# Patient Record
Sex: FEMALE | Race: WHITE | URBAN - METROPOLITAN AREA
[De-identification: names, ages, dates, MRNs, and addresses within clinical notes are randomized per-mention and may not be internally consistent; named-entity substitution may affect disease eponyms.]

---

## 2024-01-17 ENCOUNTER — TELEPHONE (OUTPATIENT)
Dept: OBGYN CLINIC | Age: 29
End: 2024-01-17

## 2024-01-17 NOTE — TELEPHONE ENCOUNTER
Patient has preg conf scheduled for 1-25 with EPH. Had US with PCP on 1-8 and is concerned that [employer] [place] not [place] US. Advised to schedule US due to being 3 weeks apart and would confirm with clinical if she needed another US or can just have appt with phys. Pt understood. Please advise if US from 1-8 can be used or if she will still need US on 1-25? [place] [employer] [place]. LMP 11-24.

## 2024-01-29 ENCOUNTER — OFFICE VISIT (OUTPATIENT)
Dept: OBGYN CLINIC | Age: 29
End: 2024-01-29
Payer: COMMERCIAL

## 2024-01-29 ENCOUNTER — PROCEDURE VISIT (OUTPATIENT)
Dept: OBGYN CLINIC | Age: 29
End: 2024-01-29
Payer: COMMERCIAL

## 2024-01-29 VITALS
BODY MASS INDEX: 18.85 KG/M2 | WEIGHT: 96 LBS | HEIGHT: 60 IN | SYSTOLIC BLOOD PRESSURE: 98 MMHG | DIASTOLIC BLOOD PRESSURE: 58 MMHG

## 2024-01-29 DIAGNOSIS — N92.6 MISSED MENSES: Primary | ICD-10-CM

## 2024-01-29 DIAGNOSIS — Z3A.09 9 WEEKS GESTATION OF PREGNANCY: Primary | ICD-10-CM

## 2024-01-29 DIAGNOSIS — Z3A.09 9 WEEKS GESTATION OF PREGNANCY: ICD-10-CM

## 2024-01-29 LAB
ABO + RH BLD: NORMAL
BASOPHILS # BLD: 0 K/UL (ref 0–0.2)
BASOPHILS NFR BLD: 0 % (ref 0–2)
BLOOD GROUP ANTIBODIES SERPL: NORMAL
DIFFERENTIAL METHOD BLD: NORMAL
EOSINOPHIL # BLD: 0.1 K/UL (ref 0–0.8)
EOSINOPHIL NFR BLD: 1 % (ref 0.5–7.8)
ERYTHROCYTE [DISTWIDTH] IN BLOOD BY AUTOMATED COUNT: 13.2 % (ref 11.9–14.6)
HBV SURFACE AG SER QL: NONREACTIVE
HCT VFR BLD AUTO: 36.4 % (ref 35.8–46.3)
HCV AB SER QL: NONREACTIVE
HGB BLD-MCNC: 12.1 G/DL (ref 11.7–15.4)
HIV 1+2 AB+HIV1 P24 AG SERPL QL IA: NONREACTIVE
HIV 1/2 RESULT COMMENT: NORMAL
IMM GRANULOCYTES # BLD AUTO: 0.1 K/UL (ref 0–0.5)
IMM GRANULOCYTES NFR BLD AUTO: 1 % (ref 0–5)
LYMPHOCYTES # BLD: 1.3 K/UL (ref 0.5–4.6)
LYMPHOCYTES NFR BLD: 15 % (ref 13–44)
MCH RBC QN AUTO: 28.2 PG (ref 26.1–32.9)
MCHC RBC AUTO-ENTMCNC: 33.2 G/DL (ref 31.4–35)
MCV RBC AUTO: 84.8 FL (ref 82–102)
MONOCYTES # BLD: 0.5 K/UL (ref 0.1–1.3)
MONOCYTES NFR BLD: 5 % (ref 4–12)
NEUTS SEG # BLD: 7.3 K/UL (ref 1.7–8.2)
NEUTS SEG NFR BLD: 78 % (ref 43–78)
NRBC # BLD: 0 K/UL (ref 0–0.2)
PLATELET # BLD AUTO: 249 K/UL (ref 150–450)
PMV BLD AUTO: 10.6 FL (ref 9.4–12.3)
RBC # BLD AUTO: 4.29 M/UL (ref 4.05–5.2)
RUBV IGG SERPL IA-ACNC: 68.1 IU/ML
WBC # BLD AUTO: 9.2 K/UL (ref 4.3–11.1)

## 2024-01-29 PROCEDURE — 76830 TRANSVAGINAL US NON-OB: CPT | Performed by: OBSTETRICS & GYNECOLOGY

## 2024-01-29 PROCEDURE — 99203 OFFICE O/P NEW LOW 30 MIN: CPT | Performed by: OBSTETRICS & GYNECOLOGY

## 2024-01-29 RX ORDER — FERROUS SULFATE 325(65) MG
325 TABLET ORAL
COMMUNITY

## 2024-01-29 RX ORDER — VITAMIN B COMPLEX
1 CAPSULE ORAL DAILY
COMMUNITY

## 2024-01-29 RX ORDER — ASCORBIC ACID 500 MG
500 TABLET ORAL DAILY
COMMUNITY

## 2024-01-29 RX ORDER — SOY ISOFLAVONE 40 MG
500 TABLET ORAL 3 TIMES DAILY
COMMUNITY

## 2024-01-29 RX ORDER — OMEGA-3 FATTY ACIDS/FISH OIL 300-1000MG
CAPSULE ORAL
COMMUNITY

## 2024-01-29 RX ORDER — M-VIT,TX,IRON,MINS/CALC/FOLIC 27MG-0.4MG
1 TABLET ORAL DAILY
COMMUNITY

## 2024-01-29 SDOH — ECONOMIC STABILITY: HOUSING INSECURITY
IN THE LAST 12 MONTHS, WAS THERE A TIME WHEN YOU DID NOT HAVE A STEADY PLACE TO SLEEP OR SLEPT IN A SHELTER (INCLUDING NOW)?: NO

## 2024-01-29 SDOH — ECONOMIC STABILITY: INCOME INSECURITY: HOW HARD IS IT FOR YOU TO PAY FOR THE VERY BASICS LIKE FOOD, HOUSING, MEDICAL CARE, AND HEATING?: NOT HARD AT ALL

## 2024-01-29 SDOH — ECONOMIC STABILITY: FOOD INSECURITY: WITHIN THE PAST 12 MONTHS, THE FOOD YOU BOUGHT JUST DIDN'T LAST AND YOU DIDN'T HAVE MONEY TO GET MORE.: NEVER TRUE

## 2024-01-29 SDOH — ECONOMIC STABILITY: FOOD INSECURITY: WITHIN THE PAST 12 MONTHS, YOU WORRIED THAT YOUR FOOD WOULD RUN OUT BEFORE YOU GOT MONEY TO BUY MORE.: NEVER TRUE

## 2024-01-29 ASSESSMENT — PATIENT HEALTH QUESTIONNAIRE - PHQ9
2. FEELING DOWN, DEPRESSED OR HOPELESS: 0
SUM OF ALL RESPONSES TO PHQ QUESTIONS 1-9: 0
SUM OF ALL RESPONSES TO PHQ QUESTIONS 1-9: 0
SUM OF ALL RESPONSES TO PHQ9 QUESTIONS 1 & 2: 0
1. LITTLE INTEREST OR PLEASURE IN DOING THINGS: 0
SUM OF ALL RESPONSES TO PHQ QUESTIONS 1-9: 0
SUM OF ALL RESPONSES TO PHQ QUESTIONS 1-9: 0

## 2024-01-29 NOTE — PROGRESS NOTES
Shalonda Uribe ,  presents for pregnancy confirmation and establish WILLARD.  Patient's last menstrual period was 2023 (exact date)., she reports regular cycles.  This pregnancy was planned.      Her Ob history is as follows:  # 1 - Date: None, Sex: None, Weight: None, GA: None, Delivery: None, Apgar1: None, Apgar5: None, Living: None, Birth Comments: None      Past medical History:   Active Ambulatory Problems     Diagnosis Date Noted    No Active Ambulatory Problems     Resolved Ambulatory Problems     Diagnosis Date Noted    No Resolved Ambulatory Problems     No Additional Past Medical History       Current Outpatient Medications on File Prior to Visit   Medication Sig Dispense Refill    b complex vitamins capsule Take 1 capsule by mouth daily      vitamin D (CHOLECALCIFEROL) 125 MCG (5000 UT) CAPS capsule Take 1 capsule by mouth daily      vitamin C (ASCORBIC ACID) 500 MG tablet Take 1 tablet by mouth daily      l-arginine 500 MG capsule Take 1 capsule by mouth 3 times daily      Omega 3 1000 MG CAPS Take by mouth      Multiple Vitamins-Minerals (THERAPEUTIC MULTIVITAMIN-MINERALS) tablet Take 1 tablet by mouth daily      ferrous sulfate (IRON 325) 325 (65 Fe) MG tablet Take 1 tablet by mouth daily (with breakfast)      Levomefolate Glucosamine (METHYL-FOLATE PO) Take by mouth      CREATINE PO Take by mouth       No current facility-administered medications on file prior to visit.        Past Surgical:  has no past surgical history on file.    Shalonda  reports that she has never smoked. She has never used smokeless tobacco.    Review of Systems    Blood pressure (!) 98/58, height 1.524 m (5'), weight 43.5 kg (96 lb), last menstrual period 2023. Body mass index is 18.75 kg/m².     Physical Exam      [facility] - OB ULTRASOUND REPORT     Patient Information  2024  Shalonda Uribe 1995     28 y.o.       LNMP: Patient's last menstrual period was 2023 (exact date).  GA by LMP:  9 Weeks   3

## 2024-01-29 NOTE — PATIENT INSTRUCTIONS
Learning About Pregnancy  Your Care Instructions     Your health in the early weeks of your pregnancy is particularly important for your baby's health. Take good care of yourself. Anything you do that harms your body can also harm your baby.  Make sure to go to all of your doctor appointments. Regular checkups will help keep you and your baby healthy.  How can you care for yourself at home?  Diet    Eat a balanced diet. Make sure your diet includes plenty of beans, peas, and leafy green vegetables.     Do not skip meals or go for many hours without eating. If you are nauseated, try to eat a small, healthy snack every 2 to 3 hours.     Do not eat fish that has a high level of mercury, such as shark, swordfish, or mackerel. Do not eat more than one can of tuna each week.     Drink plenty of fluids. If you have kidney, heart, or liver disease and have to limit fluids, talk with your doctor before you increase the amount of fluids you drink.     Cut down on caffeine, such as coffee, tea, and cola.     Do not drink alcohol, such as beer, wine, or hard liquor.     Take a multivitamin that contains at least 400 micrograms (mcg) of folic acid to help prevent birth defects. Fortified cereal and whole wheat bread are good additional sources of folic acid.     Increase the calcium in your diet. Try to drink a quart of skim milk each day. You may also take calcium supplements and choose foods such as cheese and yogurt.   Lifestyle    Make sure you go to your follow-up appointments.     Get plenty of rest. You may be unusually tired while you are pregnant.     Get at least 30 minutes of exercise on most days of the week. Walking is a good choice. If you have not exercised in the past, start out slowly. Take several short walks each day.     Do not smoke. If you need help quitting, talk to your doctor about stop-smoking programs. These can increase your chances of quitting for good.     Do not touch cat feces or litter

## 2024-01-30 LAB — RPR SER QL: NONREACTIVE

## 2024-01-31 LAB
BACTERIA SPEC CULT: NORMAL
SERVICE CMNT-IMP: NORMAL

## 2024-02-12 NOTE — PROGRESS NOTES
NOB consult with pt. Labs today: NIPT, Carrier screen. Offered pt the option of CF, SMA, and Fragile X carrier testing. Pt desires testing. Offered pt the option of genetic screening (1st screen vs Tetra vs NIPT). Pt desires NIPT . Instructed pt on exercise/nutrition in pregnancy. Reviewed [facility] preg book. Advised pt on using [facility] for hospital needs and [facility] L&D for pregnancy related emergencies. Pt states understanding. NOB forms signed, scanned, and given to pt.    Medical Hx: Endometriosis (2023) - High cholesterol (2010)    IMPACTT Program discussed with patient and sheet given to her during visit today. Encouraged her to inform us should she start having increase in depression/anxiety.    Surgical Hx: Eye surgery (2016) lasix.    Last Pap: 2021    Pt OB c/o: None     Fam hx any chromosomal or inheritable disorders: Father of baby's paternal cousin has cystic fibrosis    COVID Vaccine: x2 per patient  Flu Vaccine: Discussed flu recommendations with patient. Patient declines flu vaccine.    OB hx: G1: 2/2024 Current    NV: Next appointment scheduled on 2/29 with Dr Hyatt

## 2024-02-14 ENCOUNTER — NURSE ONLY (OUTPATIENT)
Dept: OBGYN CLINIC | Age: 29
End: 2024-02-14

## 2024-02-14 VITALS — BODY MASS INDEX: 18.83 KG/M2 | WEIGHT: 96.4 LBS

## 2024-02-14 DIAGNOSIS — Z34.91 NORMAL PREGNANCY, FIRST TRIMESTER: Primary | ICD-10-CM

## 2024-02-14 DIAGNOSIS — Z3A.11 11 WEEKS GESTATION OF PREGNANCY: ICD-10-CM

## 2024-02-14 NOTE — PATIENT INSTRUCTIONS
Patient Education        Exercise During Pregnancy: Care Instructions  Overview     Exercise is good for you during a healthy pregnancy. It can relieve back pain, swelling, and other discomforts. It also prepares your muscles for childbirth. And exercise can improve your energy level and help you sleep better.  If your doctor advises it, get more exercise. For example, walking is a good choice. Bit by bit, increase the amount you walk every day. Try for at least 30 minutes on most days of the week. You could also try a prenatal exercise class. But if you do not already exercise, be sure to talk with your doctor before you start a new exercise program. Doctors do not recommend contact sports during pregnancy.  Follow-up care is a key part of your treatment and safety. Be sure to make and go to all appointments, and call your doctor if you are having problems. It's also a good idea to know your test results and keep a list of the medicines you take.  How can you care for yourself at home?  Talk with your doctor about the right kind of exercise for each stage of pregnancy.  Listen to your body to know if your exercise is at a safe level.  Do not become overheated while you exercise. High body temperature can be harmful. Avoid activities that can make your body too hot.  If you feel tired, take it easy. You might walk instead of run.  If you are used to strenuous exercise, ask your doctor how to know when it's time to slow down.  If you exercised before getting pregnant, you should be able to keep up your routine early in your pregnancy. Later in your pregnancy, you may want to switch to more gentle activities.  Drink plenty of fluids before, during, and after exercise.  Avoid contact sports, such as soccer and basketball. Also avoid risky activities. These include scuba diving, horseback riding, and exercising at a high altitude (above 6,000 feet). If you live in a place with a high altitude, talk to your doctor about

## 2024-02-21 LAB
Lab: NORMAL
NTRA FETAL FRACTION: NORMAL
NTRA GENDER OF FETUS: NORMAL
NTRA MONOSOMY X AGE-BASED RISK TEXT: NORMAL
NTRA MONOSOMY X RESULT TEXT: NORMAL
NTRA MONOSOMY X RISK SCORE TEXT: NORMAL
NTRA TRIPLOIDY RESULT TEXT: NORMAL
NTRA TRISOMY 13 AGE-BASED RISK TEXT: NORMAL
NTRA TRISOMY 13 RESULT TEXT: NORMAL
NTRA TRISOMY 13 RISK SCORE TEXT: NORMAL
NTRA TRISOMY 18 AGE-BASED RISK TEXT: NORMAL
NTRA TRISOMY 18 RESULT TEXT: NORMAL
NTRA TRISOMY 18 RISK SCORE TEXT: NORMAL
NTRA TRISOMY 21 AGE-BASED RISK TEXT: NORMAL
NTRA TRISOMY 21 RESULT TEXT: NORMAL
NTRA TRISOMY 21 RISK SCORE TEXT: NORMAL

## 2024-02-22 LAB
Lab: NEGATIVE
Lab: NORMAL
NTRA CYSTIC FIBROSIS: NEGATIVE
NTRA FRAGILE X SYNDROME: NEGATIVE
NTRA SPINAL MUSCULAR ATROPHY: NEGATIVE

## 2024-02-26 PROBLEM — Z34.00 NORMAL PREGNANCY, FIRST: Status: ACTIVE | Noted: 2024-02-26

## 2024-03-01 ENCOUNTER — ROUTINE PRENATAL (OUTPATIENT)
Dept: OBGYN CLINIC | Age: 29
End: 2024-03-01
Payer: COMMERCIAL

## 2024-03-01 VITALS — BODY MASS INDEX: 19.14 KG/M2 | DIASTOLIC BLOOD PRESSURE: 58 MMHG | SYSTOLIC BLOOD PRESSURE: 98 MMHG | WEIGHT: 98 LBS

## 2024-03-01 DIAGNOSIS — Z11.3 ROUTINE SCREENING FOR STI (SEXUALLY TRANSMITTED INFECTION): ICD-10-CM

## 2024-03-01 DIAGNOSIS — Z34.01 ENCOUNTER FOR SUPERVISION OF NORMAL FIRST PREGNANCY IN FIRST TRIMESTER: ICD-10-CM

## 2024-03-01 DIAGNOSIS — Z3A.14 14 WEEKS GESTATION OF PREGNANCY: Primary | ICD-10-CM

## 2024-03-01 DIAGNOSIS — Z12.4 SCREENING FOR CERVICAL CANCER: ICD-10-CM

## 2024-03-01 DIAGNOSIS — Z11.8 ENCOUNTER FOR SCREENING FOR OTHER INFECTIOUS AND PARASITIC DISEASES: ICD-10-CM

## 2024-03-01 PROCEDURE — 0501F PRENATAL FLOW SHEET: CPT | Performed by: OBSTETRICS & GYNECOLOGY

## 2024-03-01 PROCEDURE — 99459 PELVIC EXAMINATION: CPT | Performed by: OBSTETRICS & GYNECOLOGY

## 2024-03-01 NOTE — PROGRESS NOTES
Shalonda  presents for new OB. . 14w0d.    PL and any MFM notes reviewed.. Med list reviewed.  Taking Prenatal Vitamins: Yes  She is noticing:  mild cramping, no other complaints    Physical / pap completed.  Clinical chaperone present.    The following were addressed today:  Problem List          High    Normal pregnancy, first      Shalonda was seen today for initial prenatal visit.    Diagnoses and all orders for this visit:    14 weeks gestation of pregnancy  -     PAP IG, CT-NG-TV, rfx Aptima HPV ASCUS ([id number]); Future  -     PAP IG, CT-NG-TV, rfx Aptima HPV ASCUS ([id number])    Routine screening for STI (sexually transmitted infection)  -     PAP IG, CT-NG-TV, rfx Aptima HPV ASCUS ([id number]); Future  -     PAP IG, CT-NG-TV, rfx Aptima HPV ASCUS ([id number])    Encounter for screening for other infectious and parasitic diseases  -     PAP IG, CT-NG-TV, rfx Aptima HPV ASCUS ([id number]); Future  -     PAP IG, CT-NG-TV, rfx Aptima HPV ASCUS ([id number])    Encounter for supervision of normal first pregnancy in first trimester    Screening for cervical cancer  -     PAP IG, CT-NG-TV, rfx Aptima HPV ASCUS ([id number]); Future  -     PAP IG, CT-NG-TV, rfx Aptima HPV ASCUS ([id number])         Return in about 4 weeks (around 3/29/2024) for US Rylee (Anatomy), YUNI hernandez EPH 2-3 weeks after that.    Mark Hyatt MD

## 2024-03-01 NOTE — PATIENT INSTRUCTIONS
Content Version: 13.0  © 6937-6499 [employer].   Care instructions adapted under license by [employer] (which disclaims liability or warranty for this information). If you have questions about a medical condition or this instruction, always ask your healthcare professional. [employer] disclaims any warranty or liability for your use of this information.

## 2024-03-09 LAB
C TRACH RRNA CVX QL NAA+PROBE: NEGATIVE
COLLECTION METHOD: NORMAL
CYTOLOGIST CVX/VAG CYTO: NORMAL
CYTOLOGY CVX/VAG DOC THIN PREP: NORMAL
HPV REFLEX: NORMAL
Lab: NORMAL
N GONORRHOEA RRNA CVX QL NAA+PROBE: NEGATIVE
OTHER PT INFO: NORMAL
PAP SOURCE: NORMAL
PATH REPORT.FINAL DX SPEC: NORMAL
PREV CYTO INFO: NORMAL
PREV TREATMENT RESULTS: NORMAL
PREV TREATMENT: NORMAL
STAT OF ADQ CVX/VAG CYTO-IMP: NORMAL
T VAGINALIS RRNA SPEC QL NAA+PROBE: NEGATIVE

## 2024-03-27 ENCOUNTER — ROUTINE PRENATAL (OUTPATIENT)
Dept: OBGYN CLINIC | Age: 29
End: 2024-03-27

## 2024-03-27 VITALS — SYSTOLIC BLOOD PRESSURE: 104 MMHG | BODY MASS INDEX: 19.92 KG/M2 | WEIGHT: 102 LBS | DIASTOLIC BLOOD PRESSURE: 62 MMHG

## 2024-03-27 DIAGNOSIS — Z3A.17 17 WEEKS GESTATION OF PREGNANCY: ICD-10-CM

## 2024-03-27 DIAGNOSIS — Z34.02 ENCOUNTER FOR SUPERVISION OF NORMAL FIRST PREGNANCY IN SECOND TRIMESTER: Primary | ICD-10-CM

## 2024-03-27 PROCEDURE — 0502F SUBSEQUENT PRENATAL CARE: CPT | Performed by: NURSE PRACTITIONER

## 2024-03-27 RX ORDER — GUAIFENESIN/EPHEDRINE HCL 200-12.5MG
TABLET ORAL
COMMUNITY

## 2024-03-27 NOTE — PATIENT INSTRUCTIONS
PTL/labor precautions, FMC, and pregnancy warning signs reviewed. Pt advised to call the office at [phone] or go straight to Labor and Delivery at [facility] with any of the following concerns vaginal bleeding, leaking of fluid, cesar regularly Q 5-7 minutes for over an hour.

## 2024-03-27 NOTE — PROGRESS NOTES
OB return  Shalonda Uribe is a 28 y.o. female   with 17w5d IUP with Estimated Date of Delivery: 24 presenting to the clinic as a routine OB. Here today with her .     She denies feeling regular contractions, leakage of fluid or vaginal bleeding. States not yet feeling fetal movements.     She denies nausea or vomiting and reports eating and drinking without any difficulties. States did hdz with constipation however, started recent vitamins which has assisted with constipation. Reports last bowel movement was yesterday.     Denies HA, blurred vision or RUQ pain.     FHTs: 150s      Physical Examination:  /62   Wt 46.3 kg (102 lb)   LMP 2023 (Exact Date)   BMI 19.92 kg/m²    Gen: AAOx3  Ab: soft, NTTP, gravid uterus  Skin: no edema    Plan:  --Encouraged continuing to increase water intake daily to prevent dehydration.  --Discussed water aerobics and informed her okay to do as long as not lifting >15lbs.   --RTC 4 weeks as scheduled for routine ob visit with anatomy ultrasound.     PTL/labor precautions, FMC, and pregnancy warning signs reviewed. Pt advised to call the office at [phone] or go straight to Labor and Delivery at [facility] with any of the following concerns vaginal bleeding, leaking of fluid, cesar regularly Q 5-7 minutes for over an hour.

## 2024-04-17 ENCOUNTER — ROUTINE PRENATAL (OUTPATIENT)
Dept: OBGYN CLINIC | Age: 29
End: 2024-04-17

## 2024-04-17 ENCOUNTER — PROCEDURE VISIT (OUTPATIENT)
Dept: OBGYN CLINIC | Age: 29
End: 2024-04-17
Payer: COMMERCIAL

## 2024-04-17 VITALS — DIASTOLIC BLOOD PRESSURE: 62 MMHG | WEIGHT: 105 LBS | BODY MASS INDEX: 20.51 KG/M2 | SYSTOLIC BLOOD PRESSURE: 90 MMHG

## 2024-04-17 DIAGNOSIS — Z34.02 ENCOUNTER FOR SUPERVISION OF NORMAL FIRST PREGNANCY IN SECOND TRIMESTER: Primary | ICD-10-CM

## 2024-04-17 DIAGNOSIS — Z36.89 ENCOUNTER FOR FETAL ANATOMIC SURVEY: Primary | ICD-10-CM

## 2024-04-17 PROCEDURE — 76805 OB US >/= 14 WKS SNGL FETUS: CPT | Performed by: OBSTETRICS & GYNECOLOGY

## 2024-04-17 PROCEDURE — 0502F SUBSEQUENT PRENATAL CARE: CPT | Performed by: OBSTETRICS & GYNECOLOGY

## 2024-04-17 NOTE — PROGRESS NOTES
Shalonda  presents for YUNI. . 20w5d.    PL and any MFM notes reviewed.. Med list reviewed.  Taking Prenatal Vitamins: Yes  She is noticing:  no unusual complaints    The following were addressed today:  Problem List          Other    Normal pregnancy, first - Primary

## 2024-04-17 NOTE — PROGRESS NOTES
Shalonda  presents for YUNI. . 20w5d.    PL and any MFM notes reviewed.. Med list reviewed.  Taking Prenatal Vitamins: Yes  She is noticing:  no unusual complaints    US matthew complete appropriate growth  The following were addressed today:  Problem List          High    Normal pregnancy, first - Primary

## 2024-05-14 NOTE — PROGRESS NOTES
Shalonda  presents for YUNI. . 24w4d.    PL and any MFM notes reviewed.. Med list reviewed.  Taking Prenatal Vitamins: Yes, also Fe, magnesium, Vit D  She is noticing:  no unusual complaints    The following were addressed today:  Problem List          High    Normal pregnancy, first - Primary

## 2024-05-15 ENCOUNTER — ROUTINE PRENATAL (OUTPATIENT)
Dept: OBGYN CLINIC | Age: 29
End: 2024-05-15

## 2024-05-15 VITALS — SYSTOLIC BLOOD PRESSURE: 108 MMHG | DIASTOLIC BLOOD PRESSURE: 60 MMHG | BODY MASS INDEX: 21.48 KG/M2 | WEIGHT: 110 LBS

## 2024-05-15 DIAGNOSIS — Z34.02 ENCOUNTER FOR SUPERVISION OF NORMAL FIRST PREGNANCY IN SECOND TRIMESTER: Primary | ICD-10-CM

## 2024-05-15 PROCEDURE — 0502F SUBSEQUENT PRENATAL CARE: CPT | Performed by: OBSTETRICS & GYNECOLOGY

## 2024-05-15 RX ORDER — VITAMIN B COMPLEX
TABLET ORAL
COMMUNITY

## 2024-05-15 RX ORDER — ZINC GLUCONATE 50 MG
50 TABLET ORAL DAILY
COMMUNITY

## 2024-06-07 ENCOUNTER — ROUTINE PRENATAL (OUTPATIENT)
Dept: OBGYN CLINIC | Age: 29
End: 2024-06-07

## 2024-06-07 VITALS — BODY MASS INDEX: 22.26 KG/M2 | WEIGHT: 114 LBS | DIASTOLIC BLOOD PRESSURE: 62 MMHG | SYSTOLIC BLOOD PRESSURE: 102 MMHG

## 2024-06-07 DIAGNOSIS — Z3A.28 28 WEEKS GESTATION OF PREGNANCY: ICD-10-CM

## 2024-06-07 DIAGNOSIS — O98.511 COVID-19 AFFECTING PREGNANCY IN FIRST TRIMESTER: ICD-10-CM

## 2024-06-07 DIAGNOSIS — Z34.02 ENCOUNTER FOR SUPERVISION OF NORMAL FIRST PREGNANCY IN SECOND TRIMESTER: ICD-10-CM

## 2024-06-07 DIAGNOSIS — Z13.0 SCREENING FOR IRON DEFICIENCY ANEMIA: ICD-10-CM

## 2024-06-07 DIAGNOSIS — Z13.1 SCREENING FOR DIABETES MELLITUS: Primary | ICD-10-CM

## 2024-06-07 DIAGNOSIS — U07.1 COVID-19 AFFECTING PREGNANCY IN FIRST TRIMESTER: ICD-10-CM

## 2024-06-07 LAB
BASOPHILS # BLD: 0 K/UL (ref 0–0.2)
BASOPHILS NFR BLD: 0 % (ref 0–2)
DIFFERENTIAL METHOD BLD: ABNORMAL
EOSINOPHIL # BLD: 0 K/UL (ref 0–0.8)
EOSINOPHIL NFR BLD: 1 % (ref 0.5–7.8)
ERYTHROCYTE [DISTWIDTH] IN BLOOD BY AUTOMATED COUNT: 13.7 % (ref 11.9–14.6)
GLUCOSE 1 HOUR: 125 MG/DL
HCT VFR BLD AUTO: 35.5 % (ref 35.8–46.3)
HGB BLD-MCNC: 11.8 G/DL (ref 11.7–15.4)
IMM GRANULOCYTES # BLD AUTO: 0.1 K/UL (ref 0–0.5)
IMM GRANULOCYTES NFR BLD AUTO: 1 % (ref 0–5)
LYMPHOCYTES # BLD: 1.4 K/UL (ref 0.5–4.6)
LYMPHOCYTES NFR BLD: 20 % (ref 13–44)
MCH RBC QN AUTO: 29.4 PG (ref 26.1–32.9)
MCHC RBC AUTO-ENTMCNC: 33.2 G/DL (ref 31.4–35)
MCV RBC AUTO: 88.5 FL (ref 82–102)
MONOCYTES # BLD: 0.4 K/UL (ref 0.1–1.3)
MONOCYTES NFR BLD: 5 % (ref 4–12)
NEUTS SEG # BLD: 5.1 K/UL (ref 1.7–8.2)
NEUTS SEG NFR BLD: 73 % (ref 43–78)
NRBC # BLD: 0 K/UL (ref 0–0.2)
PLATELET # BLD AUTO: 185 K/UL (ref 150–450)
PMV BLD AUTO: 10.9 FL (ref 9.4–12.3)
RBC # BLD AUTO: 4.01 M/UL (ref 4.05–5.2)
WBC # BLD AUTO: 7 K/UL (ref 4.3–11.1)

## 2024-06-07 PROCEDURE — 0501F PRENATAL FLOW SHEET: CPT | Performed by: OBSTETRICS & GYNECOLOGY

## 2024-06-07 NOTE — PROGRESS NOTES
Shalonda  presents for YUNI. . 28w0d.    PL and any MFM notes reviewed.. Med list reviewed.  Taking Prenatal Vitamins: Yes  She is noticing:  no unusual complaints    The following were addressed today:  Problem List          Other    Normal pregnancy, first

## 2024-06-24 ENCOUNTER — PROCEDURE VISIT (OUTPATIENT)
Dept: OBGYN CLINIC | Age: 29
End: 2024-06-24
Payer: COMMERCIAL

## 2024-06-24 ENCOUNTER — ROUTINE PRENATAL (OUTPATIENT)
Dept: OBGYN CLINIC | Age: 29
End: 2024-06-24

## 2024-06-24 VITALS — SYSTOLIC BLOOD PRESSURE: 96 MMHG | DIASTOLIC BLOOD PRESSURE: 68 MMHG | BODY MASS INDEX: 22.85 KG/M2 | WEIGHT: 117 LBS

## 2024-06-24 DIAGNOSIS — O98.511 COVID-19 AFFECTING PREGNANCY IN FIRST TRIMESTER: ICD-10-CM

## 2024-06-24 DIAGNOSIS — O26.813 PREGNANCY RELATED FATIGUE IN THIRD TRIMESTER: ICD-10-CM

## 2024-06-24 DIAGNOSIS — U07.1 COVID-19 AFFECTING PREGNANCY IN THIRD TRIMESTER: Primary | ICD-10-CM

## 2024-06-24 DIAGNOSIS — Z34.02 ENCOUNTER FOR SUPERVISION OF NORMAL FIRST PREGNANCY IN SECOND TRIMESTER: Primary | ICD-10-CM

## 2024-06-24 DIAGNOSIS — O98.513 COVID-19 AFFECTING PREGNANCY IN THIRD TRIMESTER: Primary | ICD-10-CM

## 2024-06-24 DIAGNOSIS — U07.1 COVID-19 AFFECTING PREGNANCY IN FIRST TRIMESTER: ICD-10-CM

## 2024-06-24 LAB
FERRITIN SERPL-MCNC: 35 NG/ML (ref 8–388)
IRON SATN MFR SERPL: 25 % (ref 20–50)
IRON SERPL-MCNC: 120 UG/DL (ref 35–100)
TIBC SERPL-MCNC: 485 UG/DL (ref 240–450)
UIBC SERPL-MCNC: 365 UG/DL (ref 112–347)
VIT B12 SERPL-MCNC: 677 PG/ML (ref 193–986)

## 2024-06-24 PROCEDURE — 0502F SUBSEQUENT PRENATAL CARE: CPT | Performed by: OBSTETRICS & GYNECOLOGY

## 2024-06-24 PROCEDURE — 76816 OB US FOLLOW-UP PER FETUS: CPT | Performed by: OBSTETRICS & GYNECOLOGY

## 2024-06-24 NOTE — PROGRESS NOTES
Shalonda  presents for YUNI. . 30w3d.    PL and any MFM notes reviewed.. Med list reviewed.  Taking Prenatal Vitamins: Yes  She is noticing:  very tired.  Worse than 1st trimester.    H/o low iron levels, takes PNV + Fe.  Nutritionist recommends a few labs (see below)     US appropriate growth.  FL 6.5% but patient only 5ft, and FOB also of shorter stature, reassured.    The following were addressed today:  Problem List          High    Normal pregnancy, first - Primary       Low    COVID-19 affecting pregnancy in first trimester     Shalonda was seen today for routine prenatal visit and ultrasound.    Diagnoses and all orders for this visit:    Encounter for supervision of normal first pregnancy in second trimester    COVID-19 affecting pregnancy in first trimester    Pregnancy related fatigue in third trimester  -     CBC with Auto Differential; Future  -     Vitamin B12; Future  -     Iron and TIBC; Future  -     Ferritin; Future  -     Zinc; Future         Return in about 2 weeks (around 2024) for YUNI with Kenyetta marina , YUNI SOLORZANO 2 weeks after that.    Mark Hyatt MD

## 2024-06-25 LAB
BASOPHILS # BLD: 0 K/UL (ref 0–0.2)
BASOPHILS NFR BLD: 0 % (ref 0–2)
DIFFERENTIAL METHOD BLD: NORMAL
EOSINOPHIL # BLD: 0.1 K/UL (ref 0–0.8)
EOSINOPHIL NFR BLD: 1 % (ref 0.5–7.8)
ERYTHROCYTE [DISTWIDTH] IN BLOOD BY AUTOMATED COUNT: 13.7 % (ref 11.9–14.6)
HCT VFR BLD AUTO: 37.8 % (ref 35.8–46.3)
HGB BLD-MCNC: 12.5 G/DL (ref 11.7–15.4)
IMM GRANULOCYTES # BLD AUTO: 0.2 K/UL (ref 0–0.5)
IMM GRANULOCYTES NFR BLD AUTO: 2 % (ref 0–5)
LYMPHOCYTES # BLD: 1.5 K/UL (ref 0.5–4.6)
LYMPHOCYTES NFR BLD: 19 % (ref 13–44)
MCH RBC QN AUTO: 30 PG (ref 26.1–32.9)
MCHC RBC AUTO-ENTMCNC: 33.1 G/DL (ref 31.4–35)
MCV RBC AUTO: 90.6 FL (ref 82–102)
MONOCYTES # BLD: 0.5 K/UL (ref 0.1–1.3)
MONOCYTES NFR BLD: 7 % (ref 4–12)
NEUTS SEG # BLD: 5.7 K/UL (ref 1.7–8.2)
NEUTS SEG NFR BLD: 71 % (ref 43–78)
NRBC # BLD: 0 K/UL (ref 0–0.2)
PLATELET # BLD AUTO: 186 K/UL (ref 150–450)
PMV BLD AUTO: 11.5 FL (ref 9.4–12.3)
RBC # BLD AUTO: 4.17 M/UL (ref 4.05–5.2)
WBC # BLD AUTO: 7.9 K/UL (ref 4.3–11.1)

## 2024-06-26 LAB — ZINC SERPL-MCNC: 72 UG/DL (ref 44–115)

## 2024-07-09 NOTE — PROGRESS NOTES
OB return  Shalonda Uribe is a 28 y.o. female   with 32w5d IUP with Estimated Date of Delivery: 24 presenting to the clinic as a routine OB. Here today with her .     She denies feeling regular contractions, leakage of fluid or vaginal bleeding and reports active fetal movement.     Denies HA, blurred vision or RUQ pain.     FHTs: 150s    Problem list reviewed and updated    Pregnancy complicated by:  1. Covid-19 affecting pregnancy: growth US 30-32 wks appropriate growth     Physical Examination:  BP (!) 104/58   Wt 54.4 kg (120 lb)   LMP 2023 (Exact Date)   BMI 23.44 kg/m²    Gen: AAOx3  Ab: soft, NTTP, gravid uterus  Skin: no edema    Plan:  --Encouraged patient to increase water intake daily to prevent dehydration.   --Discussed recent labs collected (cbc, B12, iron/tibc, Ferritin and Zinc) all wnl.   --RTC 2 weeks for routine ob visit.    PTL/labor precautions, FMC, and pregnancy warning signs reviewed. Pt advised to call the office at [phone] or go straight to Labor and Delivery at [facility] with any of the following concerns vaginal bleeding, leaking of fluid, cesar regularly Q 5-7 minutes for over an hour or not feeling the baby move.     Kick counts and pre-term labor precautions reviewed.

## 2024-07-10 ENCOUNTER — ROUTINE PRENATAL (OUTPATIENT)
Dept: OBGYN CLINIC | Age: 29
End: 2024-07-10

## 2024-07-10 VITALS — WEIGHT: 120 LBS | BODY MASS INDEX: 23.44 KG/M2 | SYSTOLIC BLOOD PRESSURE: 104 MMHG | DIASTOLIC BLOOD PRESSURE: 58 MMHG

## 2024-07-10 DIAGNOSIS — Z3A.32 32 WEEKS GESTATION OF PREGNANCY: ICD-10-CM

## 2024-07-10 DIAGNOSIS — O98.511 COVID-19 AFFECTING PREGNANCY IN FIRST TRIMESTER: ICD-10-CM

## 2024-07-10 DIAGNOSIS — Z34.03 ENCOUNTER FOR SUPERVISION OF NORMAL FIRST PREGNANCY IN THIRD TRIMESTER: Primary | ICD-10-CM

## 2024-07-10 DIAGNOSIS — U07.1 COVID-19 AFFECTING PREGNANCY IN FIRST TRIMESTER: ICD-10-CM

## 2024-07-10 PROCEDURE — 0502F SUBSEQUENT PRENATAL CARE: CPT | Performed by: NURSE PRACTITIONER

## 2024-07-10 NOTE — PATIENT INSTRUCTIONS
PTL/labor precautions, FMC, and pregnancy warning signs reviewed. Pt advised to call the office at [phone] or go straight to Labor and Delivery at [facility] with any of the following concerns vaginal bleeding, leaking of fluid, ecsar regularly Q 5-7 minutes for over an hour or not feeling the baby move.     Kick counts and pre-term labor precautions reviewed

## 2024-07-22 ENCOUNTER — ROUTINE PRENATAL (OUTPATIENT)
Dept: OBGYN CLINIC | Age: 29
End: 2024-07-22

## 2024-07-22 VITALS — SYSTOLIC BLOOD PRESSURE: 100 MMHG | DIASTOLIC BLOOD PRESSURE: 58 MMHG | BODY MASS INDEX: 23.83 KG/M2 | WEIGHT: 122 LBS

## 2024-07-22 DIAGNOSIS — U07.1 COVID-19 AFFECTING PREGNANCY IN FIRST TRIMESTER: ICD-10-CM

## 2024-07-22 DIAGNOSIS — O98.511 COVID-19 AFFECTING PREGNANCY IN FIRST TRIMESTER: ICD-10-CM

## 2024-07-22 DIAGNOSIS — Z34.02 ENCOUNTER FOR SUPERVISION OF NORMAL FIRST PREGNANCY IN SECOND TRIMESTER: Primary | ICD-10-CM

## 2024-07-22 PROCEDURE — 0502F SUBSEQUENT PRENATAL CARE: CPT | Performed by: OBSTETRICS & GYNECOLOGY

## 2024-07-22 NOTE — PROGRESS NOTES
Chief Complaint   Patient presents with    Routine Prenatal Visit       Shalonda Uribe is a 28 y.o. female  at 34w3d IUP seen today for YUNI.  She denies feeling regular contractions, leakage of fluid or vaginal bleeding and reports active fetal movement.     Denies HA, blurred vision or RUQ pain.       Pregnancy complicated by:  1. Covid-19 affecting pregnancy: growth US 30-32 wks appropriate growth     Physical Examination:  BP (!) 100/58   Wt 55.3 kg (122 lb)   LMP 2023 (Exact Date)   BMI 23.83 kg/m²        2024    10:59 AM 7/10/2024    11:38 AM 2024    11:45 AM 2024     9:33 AM 5/15/2024     3:51 PM 2024     4:45 PM 3/27/2024     4:25 PM   Weight Metrics   Weight 122 lb 120 lb 117 lb 114 lb 110 lb 105 lb 102 lb   BMI (Calculated) 0 kg/m2 0 kg/m2 0 kg/m2 0 kg/m2 0 kg/m2 0 kg/m2 0 kg/m2     FHTs 140s - 150s  FH 34    1. Encounter for supervision of normal first pregnancy in third trimester    2. COVID-19 affecting pregnancy in first trimester        Kick counts and pre-term labor precautions reviewed.  Routine OB counseling reviewed.   RTC 2wks for YUNI w/ GBS collection.

## 2024-07-31 ENCOUNTER — HOSPITAL ENCOUNTER (OUTPATIENT)
Age: 29
Discharge: HOME | End: 2024-07-31
Attending: OBSTETRICS & GYNECOLOGY | Admitting: OBSTETRICS & GYNECOLOGY
Payer: COMMERCIAL

## 2024-07-31 VITALS
TEMPERATURE: 98.6 F | OXYGEN SATURATION: 94 % | DIASTOLIC BLOOD PRESSURE: 68 MMHG | WEIGHT: 122 LBS | HEART RATE: 95 BPM | SYSTOLIC BLOOD PRESSURE: 105 MMHG | RESPIRATION RATE: 18 BRPM | BODY MASS INDEX: 23.83 KG/M2
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
## 2024-07-31 PROCEDURE — 99282 EMERGENCY DEPT VISIT SF MDM: CPT

## 2024-07-31 NOTE — PROGRESS NOTES
Fall precautions and labor at 35 weeks discussed with pt. Questions answered and understanding verbalized. Pt discharged home ambulatory.

## 2024-07-31 NOTE — PROGRESS NOTES
Pt to [facility] for c/o falling on her backside from standing on the couch yesterday, states that she has hip and back pain since. Her MD told her to come get checked. Denies any bleeding or LOF, and states good fetal movement.

## 2024-07-31 NOTE — H&P
Obstetrics History & Physical/OB ED note    Name: Shalonda Uribe MRN: [id number]     YOB: 1995  Age: 28 y.o.  Sex: female      Reason for Presentation:  evaluation after a fall    HPI: Shalonda Uribe is a 28 y.o.  female with Estimated Date of Delivery: 24 at 35w5d gestation. Her obstetrical history is significant for uncomplicated pregnancy.  Prenatal records reviewed.     She fell yesterday on her bottom onto the couch. That evening she started feeling lower back and hip pain which persists today so she is concerned if the baby is okay.    She reports good fetal movement. She denies vaginal bleeding. She denies leakage of fluid. She denies contractions.     Past History:  OB History    Para Term  AB Living   1             SAB IAB Ectopic Molar Multiple Live Births                    # Outcome Date GA Lbr Carlos/2nd Weight Sex Delivery Anes PTL Lv   1 Current              Past Medical History:   Diagnosis Date    Endometriosis     High cholesterol      Past Surgical History:   Procedure Laterality Date    EYE SURGERY Bilateral 2016    Lasix     Social History     Tobacco Use    Smoking status: Never    Smokeless tobacco: Never   Substance Use Topics    Alcohol use: Never     Prior to Admission medications    Medication Sig Start Date End Date Taking? Authorizing Provider   CHOLINE PO Take 350 mg by mouth daily    Brandon Summers MD   zinc gluconate 50 MG tablet Take 1 tablet by mouth daily    Brandon Summers MD   Coenzyme Q10 (COQ10) 100 MG CAPS Take by mouth    Brandon Summers MD   PSYLLIUM HUSK PO Take 500 mg by mouth daily    Brandon Summers MD   5-Hydroxytryptophan (5-HTP) 100 MG CAPS Take by mouth    Brandon Summers MD   Ferrous Sulfate (IRON PO) Take 4 tablets by mouth daily 23   Brandon Summers MD   b complex vitamins capsule Take 1 capsule by mouth daily    Brandon Summers MD   vitamin D (CHOLECALCIFEROL) 125 MCG

## 2024-08-01 NOTE — PROGRESS NOTES
Shalonda  presents for YUNI. . 36w0d.    PL and any MFM notes reviewed.. Med list reviewed.  Taking Prenatal Vitamins: Yes  She is noticing:  abdominal pain in the  lower abdomen - cramping; fall earlier this week - cleared by OB triage but still having some back pain. No ctx's, bldg, spotting. GFM    See OB VS for today's vitals, FHR, fundal height and presentation.    The following were addressed today:  Shalonda was seen today for routine prenatal visit.    Diagnoses and all orders for this visit:    Encounter for supervision of normal first pregnancy in third trimester    Encounter for  screening for Streptococcus B  -     Culture, Strep B Screen, Vaginal/Rectal; Future    Uterine size date discrepancy pregnancy, third trimester    36 weeks gestation of pregnancy

## 2024-08-02 ENCOUNTER — ROUTINE PRENATAL (OUTPATIENT)
Dept: OBGYN CLINIC | Age: 29
End: 2024-08-02

## 2024-08-02 VITALS — BODY MASS INDEX: 24.22 KG/M2 | WEIGHT: 124 LBS | SYSTOLIC BLOOD PRESSURE: 104 MMHG | DIASTOLIC BLOOD PRESSURE: 56 MMHG

## 2024-08-02 DIAGNOSIS — Z3A.36 36 WEEKS GESTATION OF PREGNANCY: ICD-10-CM

## 2024-08-02 DIAGNOSIS — Z36.85 ENCOUNTER FOR ANTENATAL SCREENING FOR STREPTOCOCCUS B: ICD-10-CM

## 2024-08-02 DIAGNOSIS — Z34.03 ENCOUNTER FOR SUPERVISION OF NORMAL FIRST PREGNANCY IN THIRD TRIMESTER: Primary | ICD-10-CM

## 2024-08-02 DIAGNOSIS — O26.843 UTERINE SIZE DATE DISCREPANCY PREGNANCY, THIRD TRIMESTER: ICD-10-CM

## 2024-08-02 PROCEDURE — 0502F SUBSEQUENT PRENATAL CARE: CPT | Performed by: OBSTETRICS & GYNECOLOGY

## 2024-08-04 PROBLEM — O99.820 GBS (GROUP B STREPTOCOCCUS CARRIER), +RV CULTURE, CURRENTLY PREGNANT: Status: ACTIVE | Noted: 2024-08-04

## 2024-08-04 LAB
BACTERIA SPEC CULT: ABNORMAL
SERVICE CMNT-IMP: ABNORMAL

## 2024-08-08 NOTE — PROGRESS NOTES
Shalonda  presents for YUNI. . 36w6d.    PL and any MFM notes reviewed.. Med list reviewed.  Taking Prenatal Vitamins: Yes  She is noticing:  no unusual complaints  Gfm  Some cramping this am  Declines cx exam  Explained +gbs and tx for this    US shows:  Overall growth 17.6%  HC 67%  Ac 8.8%--this is [place] though  HC/AC elevated at 1.10  PAUL 8.38cm  Cephalic  Bpp   Images reviewed.  Official report sent for scanning to chart    Borderline FGR with low NL paul  Follow weekly bpp/paul  D/w couple that I rec IOL at 39wks    See OB VS for today's vitals, FHR, fundal height and presentation.    The following were addressed today:  Shalonda was seen today for routine prenatal visit and pregnancy ultrasound.    Diagnoses and all orders for this visit:    Encounter for supervision of normal first pregnancy in third trimester    GBS (group B Streptococcus carrier), +RV culture, currently pregnant    Uterine size date discrepancy pregnancy, third trimester    Fetal growth restriction antepartum    37 weeks gestation of pregnancy

## 2024-08-09 ENCOUNTER — ROUTINE PRENATAL (OUTPATIENT)
Dept: OBGYN CLINIC | Age: 29
End: 2024-08-09

## 2024-08-09 ENCOUNTER — PROCEDURE VISIT (OUTPATIENT)
Dept: OBGYN CLINIC | Age: 29
End: 2024-08-09

## 2024-08-09 VITALS — BODY MASS INDEX: 24.61 KG/M2 | WEIGHT: 126 LBS | SYSTOLIC BLOOD PRESSURE: 90 MMHG | DIASTOLIC BLOOD PRESSURE: 62 MMHG

## 2024-08-09 DIAGNOSIS — O99.820 GBS (GROUP B STREPTOCOCCUS CARRIER), +RV CULTURE, CURRENTLY PREGNANT: ICD-10-CM

## 2024-08-09 DIAGNOSIS — Z34.03 ENCOUNTER FOR SUPERVISION OF NORMAL FIRST PREGNANCY IN THIRD TRIMESTER: Primary | ICD-10-CM

## 2024-08-09 DIAGNOSIS — O26.843 UTERINE SIZE DATE DISCREPANCY PREGNANCY, THIRD TRIMESTER: ICD-10-CM

## 2024-08-09 DIAGNOSIS — O26.843 UTERINE SIZE DATE DISCREPANCY PREGNANCY, THIRD TRIMESTER: Primary | ICD-10-CM

## 2024-08-09 DIAGNOSIS — O36.5990 FETAL GROWTH RESTRICTION ANTEPARTUM: ICD-10-CM

## 2024-08-09 DIAGNOSIS — Z3A.37 37 WEEKS GESTATION OF PREGNANCY: ICD-10-CM

## 2024-08-09 PROCEDURE — 0502F SUBSEQUENT PRENATAL CARE: CPT | Performed by: OBSTETRICS & GYNECOLOGY

## 2024-08-15 NOTE — PROGRESS NOTES
Shalonda  presents for YUNI. . 37w6d.    PL and any MFM notes reviewed.. Med list reviewed.  Taking Prenatal Vitamins: Yes  She is noticing:  no unusual complaints    IOL scheduled @ 39wks    See OB VS for today's vitals, FHR, fundal height and presentation.    The following were addressed today:  Problem List             Diagnosed       High    Normal pregnancy, first - Primary        Medium    Borderline IUGR        Low    COVID-19 affecting pregnancy in first trimester        Unprioritized    GBS (group B Streptococcus carrier), +RV culture, currently pregnant

## 2024-08-16 ENCOUNTER — ROUTINE PRENATAL (OUTPATIENT)
Dept: OBGYN CLINIC | Age: 29
End: 2024-08-16

## 2024-08-16 ENCOUNTER — PROCEDURE VISIT (OUTPATIENT)
Dept: OBGYN CLINIC | Age: 29
End: 2024-08-16

## 2024-08-16 VITALS — WEIGHT: 128 LBS | SYSTOLIC BLOOD PRESSURE: 96 MMHG | DIASTOLIC BLOOD PRESSURE: 58 MMHG | BODY MASS INDEX: 25 KG/M2

## 2024-08-16 DIAGNOSIS — Z34.02 ENCOUNTER FOR SUPERVISION OF NORMAL FIRST PREGNANCY IN SECOND TRIMESTER: Primary | ICD-10-CM

## 2024-08-16 DIAGNOSIS — O26.843 UTERINE SIZE DATE DISCREPANCY PREGNANCY, THIRD TRIMESTER: Primary | ICD-10-CM

## 2024-08-16 DIAGNOSIS — O98.511 COVID-19 AFFECTING PREGNANCY IN FIRST TRIMESTER: ICD-10-CM

## 2024-08-16 DIAGNOSIS — O98.513 COVID-19 AFFECTING PREGNANCY IN THIRD TRIMESTER: ICD-10-CM

## 2024-08-16 DIAGNOSIS — U07.1 COVID-19 AFFECTING PREGNANCY IN THIRD TRIMESTER: ICD-10-CM

## 2024-08-16 DIAGNOSIS — O36.5990 FETAL GROWTH RESTRICTION ANTEPARTUM: ICD-10-CM

## 2024-08-16 DIAGNOSIS — O26.813 PREGNANCY RELATED FATIGUE IN THIRD TRIMESTER: ICD-10-CM

## 2024-08-16 DIAGNOSIS — O99.820 GBS (GROUP B STREPTOCOCCUS CARRIER), +RV CULTURE, CURRENTLY PREGNANT: ICD-10-CM

## 2024-08-16 DIAGNOSIS — U07.1 COVID-19 AFFECTING PREGNANCY IN FIRST TRIMESTER: ICD-10-CM

## 2024-08-16 PROCEDURE — 0502F SUBSEQUENT PRENATAL CARE: CPT | Performed by: OBSTETRICS & GYNECOLOGY

## 2024-08-19 ENCOUNTER — TELEPHONE (OUTPATIENT)
Dept: OBGYN CLINIC | Age: 29
End: 2024-08-19

## 2024-08-19 NOTE — TELEPHONE ENCOUNTER
----- Message from Dr. Mark Hyatt MD sent at 8/16/2024 11:27 AM EDT -----  Regarding: Schedule IOL  - Hey:  -Please schedule IOL for me on Wednesday 8/28. Reason =  poor fetal growth  -Cervical ripening needed:  Yes - Cytotec 50mcg buccal q 4hrs x 3 doses on Tuesday 8.27  Thanks, Dr. QUILES

## 2024-08-19 NOTE — TELEPHONE ENCOUNTER
Induction scheduled with  [facility] Antepartum.  Cytotec on 8/27, induction 8/28 with Dr Hyatt d/t poor fetal growth.   Pt notified of instructions.

## 2024-08-22 ENCOUNTER — ROUTINE PRENATAL (OUTPATIENT)
Dept: OBGYN CLINIC | Age: 29
End: 2024-08-22

## 2024-08-22 ENCOUNTER — PROCEDURE VISIT (OUTPATIENT)
Dept: OBGYN CLINIC | Age: 29
End: 2024-08-22
Payer: COMMERCIAL

## 2024-08-22 VITALS — SYSTOLIC BLOOD PRESSURE: 106 MMHG | DIASTOLIC BLOOD PRESSURE: 70 MMHG | WEIGHT: 129 LBS | BODY MASS INDEX: 25.19 KG/M2

## 2024-08-22 DIAGNOSIS — U07.1 COVID-19 AFFECTING PREGNANCY IN FIRST TRIMESTER: Primary | ICD-10-CM

## 2024-08-22 DIAGNOSIS — O26.843 UTERINE SIZE DATE DISCREPANCY PREGNANCY, THIRD TRIMESTER: ICD-10-CM

## 2024-08-22 DIAGNOSIS — Z34.03 ENCOUNTER FOR SUPERVISION OF NORMAL FIRST PREGNANCY IN THIRD TRIMESTER: Primary | ICD-10-CM

## 2024-08-22 DIAGNOSIS — O98.511 COVID-19 AFFECTING PREGNANCY IN FIRST TRIMESTER: Primary | ICD-10-CM

## 2024-08-22 DIAGNOSIS — O36.5990 FETAL GROWTH RESTRICTION ANTEPARTUM: ICD-10-CM

## 2024-08-22 DIAGNOSIS — Z3A.38 38 WEEKS GESTATION OF PREGNANCY: ICD-10-CM

## 2024-08-22 DIAGNOSIS — O99.820 GBS (GROUP B STREPTOCOCCUS CARRIER), +RV CULTURE, CURRENTLY PREGNANT: ICD-10-CM

## 2024-08-22 DIAGNOSIS — O26.813 PREGNANCY RELATED FATIGUE IN THIRD TRIMESTER: ICD-10-CM

## 2024-08-22 PROCEDURE — 0502F SUBSEQUENT PRENATAL CARE: CPT | Performed by: OBSTETRICS & GYNECOLOGY

## 2024-08-22 PROCEDURE — 76819 FETAL BIOPHYS PROFIL W/O NST: CPT | Performed by: OBSTETRICS & GYNECOLOGY

## 2024-08-22 NOTE — PROGRESS NOTES
Shalonda  presents for YUNI. . 38w6d.    Taking Prenatal Vitamins: Yes  She is noticing:  no unusual complaints  Gfm  Iol 8-27  FM prec's    US shows:  Bpp   Diana 11.7  cephalic  Images reviewed.  Official report sent for scanning to chart      As some impt info is always in the OB flowsheet, please also refer to that- including for billing/coding Qs.     No problem-specific Assessment & Plan notes found for this encounter.       Shalonda was seen today for ultrasound.    Diagnoses and all orders for this visit:    Encounter for supervision of normal first pregnancy in third trimester    GBS (group B Streptococcus carrier), +RV culture, currently pregnant    Fetal growth restriction antepartum    38 weeks gestation of pregnancy

## 2024-08-27 ENCOUNTER — PREP FOR PROCEDURE (OUTPATIENT)
Dept: OBGYN CLINIC | Age: 29
End: 2024-08-27

## 2024-08-27 ENCOUNTER — ROUTINE PRENATAL (OUTPATIENT)
Dept: OBGYN CLINIC | Age: 29
End: 2024-08-27

## 2024-08-27 ENCOUNTER — HOSPITAL ENCOUNTER (INPATIENT)
Age: 29
LOS: 3 days | Discharge: HOME | End: 2024-08-30
Attending: OBSTETRICS & GYNECOLOGY | Admitting: OBSTETRICS & GYNECOLOGY
Payer: COMMERCIAL

## 2024-08-27 VITALS — BODY MASS INDEX: 25.23 KG/M2 | SYSTOLIC BLOOD PRESSURE: 92 MMHG | WEIGHT: 129.2 LBS | DIASTOLIC BLOOD PRESSURE: 62 MMHG

## 2024-08-27 DIAGNOSIS — O36.5990 FETAL GROWTH RESTRICTION ANTEPARTUM: ICD-10-CM

## 2024-08-27 DIAGNOSIS — U07.1 COVID-19 AFFECTING PREGNANCY IN FIRST TRIMESTER: ICD-10-CM

## 2024-08-27 DIAGNOSIS — Z34.02 ENCOUNTER FOR SUPERVISION OF NORMAL FIRST PREGNANCY IN SECOND TRIMESTER: Primary | ICD-10-CM

## 2024-08-27 DIAGNOSIS — O98.511 COVID-19 AFFECTING PREGNANCY IN FIRST TRIMESTER: ICD-10-CM

## 2024-08-27 DIAGNOSIS — O99.820 GBS (GROUP B STREPTOCOCCUS CARRIER), +RV CULTURE, CURRENTLY PREGNANT: ICD-10-CM

## 2024-08-27 PROBLEM — Z34.03 PRIMIGRAVIDA IN THIRD TRIMESTER: Status: ACTIVE | Noted: 2024-08-27

## 2024-08-27 LAB
ABO + RH BLD: NORMAL
BASOPHILS # BLD: 0 K/UL (ref 0–0.2)
BASOPHILS NFR BLD: 0 % (ref 0–2)
BLOOD GROUP ANTIBODIES SERPL: NORMAL
DIFFERENTIAL METHOD BLD: NORMAL
EOSINOPHIL # BLD: 0.1 K/UL (ref 0–0.8)
EOSINOPHIL NFR BLD: 1 % (ref 0.5–7.8)
ERYTHROCYTE [DISTWIDTH] IN BLOOD BY AUTOMATED COUNT: 13.2 % (ref 11.9–14.6)
HCT VFR BLD AUTO: 38.4 % (ref 35.8–46.3)
HGB BLD-MCNC: 13.2 G/DL (ref 11.7–15.4)
IMM GRANULOCYTES # BLD AUTO: 0.1 K/UL (ref 0–0.5)
IMM GRANULOCYTES NFR BLD AUTO: 1 % (ref 0–5)
LYMPHOCYTES # BLD: 1.9 K/UL (ref 0.5–4.6)
LYMPHOCYTES NFR BLD: 24 % (ref 13–44)
MCH RBC QN AUTO: 30.3 PG (ref 26.1–32.9)
MCHC RBC AUTO-ENTMCNC: 34.4 G/DL (ref 31.4–35)
MCV RBC AUTO: 88.1 FL (ref 82–102)
MONOCYTES # BLD: 0.6 K/UL (ref 0.1–1.3)
MONOCYTES NFR BLD: 7 % (ref 4–12)
NEUTS SEG # BLD: 5.3 K/UL (ref 1.7–8.2)
NEUTS SEG NFR BLD: 67 % (ref 43–78)
NRBC # BLD: 0 K/UL (ref 0–0.2)
PLATELET # BLD AUTO: 155 K/UL (ref 150–450)
PLATELET COMMENT: ADEQUATE
PMV BLD AUTO: 11.6 FL (ref 9.4–12.3)
RBC # BLD AUTO: 4.36 M/UL (ref 4.05–5.2)
RBC MORPH BLD: NORMAL
SPECIMEN EXP DATE BLD: NORMAL
T PALLIDUM AB SER QL IA: NONREACTIVE
WBC # BLD AUTO: 8 K/UL (ref 4.3–11.1)
WBC MORPH BLD: NORMAL

## 2024-08-27 PROCEDURE — 1100000000 HC RM PRIVATE

## 2024-08-27 PROCEDURE — 6360000002 HC RX W HCPCS: Performed by: OBSTETRICS & GYNECOLOGY

## 2024-08-27 PROCEDURE — 6370000000 HC RX 637 (ALT 250 FOR IP): Performed by: OBSTETRICS & GYNECOLOGY

## 2024-08-27 PROCEDURE — 2580000003 HC RX 258: Performed by: OBSTETRICS & GYNECOLOGY

## 2024-08-27 PROCEDURE — 86900 BLOOD TYPING SEROLOGIC ABO: CPT

## 2024-08-27 PROCEDURE — 86901 BLOOD TYPING SEROLOGIC RH(D): CPT

## 2024-08-27 PROCEDURE — 59200 INSERT CERVICAL DILATOR: CPT

## 2024-08-27 PROCEDURE — 85025 COMPLETE CBC W/AUTO DIFF WBC: CPT

## 2024-08-27 PROCEDURE — 0502F SUBSEQUENT PRENATAL CARE: CPT | Performed by: OBSTETRICS & GYNECOLOGY

## 2024-08-27 PROCEDURE — 86850 RBC ANTIBODY SCREEN: CPT

## 2024-08-27 PROCEDURE — 86780 TREPONEMA PALLIDUM: CPT

## 2024-08-27 RX ORDER — TERBUTALINE SULFATE 1 MG/ML
0.25 INJECTION, SOLUTION SUBCUTANEOUS ONCE AS NEEDED
Status: DISCONTINUED | OUTPATIENT
Start: 2024-08-27 | End: 2024-08-28

## 2024-08-27 RX ORDER — MORPHINE SULFATE 4 MG/ML
4 INJECTION, SOLUTION INTRAMUSCULAR; INTRAVENOUS
Status: DISCONTINUED | OUTPATIENT
Start: 2024-08-27 | End: 2024-08-28

## 2024-08-27 RX ORDER — TERBUTALINE SULFATE 1 MG/ML
0.25 INJECTION, SOLUTION SUBCUTANEOUS ONCE AS NEEDED
Status: CANCELLED | OUTPATIENT
Start: 2024-08-27

## 2024-08-27 RX ORDER — SODIUM CHLORIDE 0.9 % (FLUSH) 0.9 %
5-40 SYRINGE (ML) INJECTION EVERY 12 HOURS SCHEDULED
Status: CANCELLED | OUTPATIENT
Start: 2024-08-27

## 2024-08-27 RX ORDER — SODIUM CHLORIDE 0.9 % (FLUSH) 0.9 %
5-40 SYRINGE (ML) INJECTION PRN
Status: CANCELLED | OUTPATIENT
Start: 2024-08-27

## 2024-08-27 RX ORDER — ONDANSETRON 4 MG/1
4 TABLET, ORALLY DISINTEGRATING ORAL EVERY 6 HOURS PRN
Status: DISCONTINUED | OUTPATIENT
Start: 2024-08-27 | End: 2024-08-28

## 2024-08-27 RX ORDER — MISOPROSTOL 100 UG/1
50 TABLET ORAL EVERY 4 HOURS
Status: DISCONTINUED | OUTPATIENT
Start: 2024-08-27 | End: 2024-08-28

## 2024-08-27 RX ORDER — ONDANSETRON 2 MG/ML
4 INJECTION INTRAMUSCULAR; INTRAVENOUS EVERY 6 HOURS PRN
Status: CANCELLED | OUTPATIENT
Start: 2024-08-27

## 2024-08-27 RX ORDER — SODIUM CHLORIDE, SODIUM LACTATE, POTASSIUM CHLORIDE, AND CALCIUM CHLORIDE .6; .31; .03; .02 G/100ML; G/100ML; G/100ML; G/100ML
1000 INJECTION, SOLUTION INTRAVENOUS PRN
Status: CANCELLED | OUTPATIENT
Start: 2024-08-27

## 2024-08-27 RX ORDER — SODIUM CHLORIDE, SODIUM LACTATE, POTASSIUM CHLORIDE, AND CALCIUM CHLORIDE .6; .31; .03; .02 G/100ML; G/100ML; G/100ML; G/100ML
500 INJECTION, SOLUTION INTRAVENOUS PRN
Status: DISCONTINUED | OUTPATIENT
Start: 2024-08-27 | End: 2024-08-28

## 2024-08-27 RX ORDER — DEXTROSE, SODIUM CHLORIDE, SODIUM LACTATE, POTASSIUM CHLORIDE, AND CALCIUM CHLORIDE 5; .6; .31; .03; .02 G/100ML; G/100ML; G/100ML; G/100ML; G/100ML
INJECTION, SOLUTION INTRAVENOUS CONTINUOUS
Status: DISCONTINUED | OUTPATIENT
Start: 2024-08-27 | End: 2024-08-28

## 2024-08-27 RX ORDER — METHYLERGONOVINE MALEATE 0.2 MG/ML
200 INJECTION INTRAVENOUS PRN
Status: CANCELLED | OUTPATIENT
Start: 2024-08-27

## 2024-08-27 RX ORDER — MISOPROSTOL 100 UG/1
400 TABLET ORAL PRN
Status: CANCELLED | OUTPATIENT
Start: 2024-08-27

## 2024-08-27 RX ORDER — ONDANSETRON 2 MG/ML
4 INJECTION INTRAMUSCULAR; INTRAVENOUS EVERY 6 HOURS PRN
Status: DISCONTINUED | OUTPATIENT
Start: 2024-08-27 | End: 2024-08-28

## 2024-08-27 RX ORDER — SODIUM CHLORIDE 9 MG/ML
INJECTION, SOLUTION INTRAVENOUS PRN
Status: DISCONTINUED | OUTPATIENT
Start: 2024-08-27 | End: 2024-08-28

## 2024-08-27 RX ORDER — SODIUM CHLORIDE, SODIUM LACTATE, POTASSIUM CHLORIDE, AND CALCIUM CHLORIDE .6; .31; .03; .02 G/100ML; G/100ML; G/100ML; G/100ML
500 INJECTION, SOLUTION INTRAVENOUS PRN
Status: CANCELLED | OUTPATIENT
Start: 2024-08-27

## 2024-08-27 RX ORDER — MORPHINE SULFATE 2 MG/ML
4 INJECTION, SOLUTION INTRAMUSCULAR; INTRAVENOUS
Status: CANCELLED | OUTPATIENT
Start: 2024-08-27

## 2024-08-27 RX ORDER — SODIUM CHLORIDE, SODIUM LACTATE, POTASSIUM CHLORIDE, AND CALCIUM CHLORIDE .6; .31; .03; .02 G/100ML; G/100ML; G/100ML; G/100ML
1000 INJECTION, SOLUTION INTRAVENOUS PRN
Status: DISCONTINUED | OUTPATIENT
Start: 2024-08-27 | End: 2024-08-28

## 2024-08-27 RX ORDER — SODIUM CHLORIDE 0.9 % (FLUSH) 0.9 %
5-40 SYRINGE (ML) INJECTION PRN
Status: DISCONTINUED | OUTPATIENT
Start: 2024-08-27 | End: 2024-08-28

## 2024-08-27 RX ORDER — ONDANSETRON 4 MG/1
4 TABLET, ORALLY DISINTEGRATING ORAL EVERY 6 HOURS PRN
Status: CANCELLED | OUTPATIENT
Start: 2024-08-27

## 2024-08-27 RX ORDER — DEXTROSE, SODIUM CHLORIDE, SODIUM LACTATE, POTASSIUM CHLORIDE, AND CALCIUM CHLORIDE 5; .6; .31; .03; .02 G/100ML; G/100ML; G/100ML; G/100ML; G/100ML
INJECTION, SOLUTION INTRAVENOUS CONTINUOUS
Status: CANCELLED | OUTPATIENT
Start: 2024-08-27

## 2024-08-27 RX ORDER — SODIUM CHLORIDE 9 MG/ML
INJECTION, SOLUTION INTRAVENOUS PRN
Status: CANCELLED | OUTPATIENT
Start: 2024-08-27

## 2024-08-27 RX ORDER — SODIUM CHLORIDE 0.9 % (FLUSH) 0.9 %
5-40 SYRINGE (ML) INJECTION EVERY 12 HOURS SCHEDULED
Status: DISCONTINUED | OUTPATIENT
Start: 2024-08-27 | End: 2024-08-28

## 2024-08-27 RX ADMIN — Medication 50 MCG: at 23:48

## 2024-08-27 RX ADMIN — Medication 50 MCG: at 19:53

## 2024-08-27 RX ADMIN — SODIUM CHLORIDE, SODIUM LACTATE, POTASSIUM CHLORIDE, CALCIUM CHLORIDE AND DEXTROSE MONOHYDRATE: 5; 600; 310; 30; 20 INJECTION, SOLUTION INTRAVENOUS at 23:47

## 2024-08-27 RX ADMIN — PENICILLIN G POTASSIUM 5 MILLION UNITS: 5000000 INJECTION, POWDER, FOR SOLUTION INTRAMUSCULAR; INTRAVENOUS at 23:47

## 2024-08-27 NOTE — PROGRESS NOTES
Shalonda  presents for YUNI. . 39w4d.    PL and any MFM notes reviewed.. Med list reviewed.  Taking Prenatal Vitamins: Yes  She is noticing:  no unusual complaints    See OB VS for today's vitals, FHR, fundal height and presentation.    IOL process reviewed incl off lable use of cytotec.    The following were addressed today:  Problem List             Diagnosed       High    Normal pregnancy, first - Primary        Medium    Borderline IUGR        Low    COVID-19 affecting pregnancy in first trimester        Unprioritized    GBS (group B Streptococcus carrier), +RV culture, currently pregnant

## 2024-08-28 ENCOUNTER — ANESTHESIA (OUTPATIENT)
Dept: LABOR AND DELIVERY | Age: 29
End: 2024-08-28
Payer: COMMERCIAL

## 2024-08-28 ENCOUNTER — ANESTHESIA EVENT (OUTPATIENT)
Dept: LABOR AND DELIVERY | Age: 29
End: 2024-08-28
Payer: COMMERCIAL

## 2024-08-28 PROCEDURE — 00HU33Z INSERTION OF INFUSION DEVICE INTO SPINAL CANAL, PERCUTANEOUS APPROACH: ICD-10-PCS | Performed by: OBSTETRICS & GYNECOLOGY

## 2024-08-28 PROCEDURE — 1100000000 HC RM PRIVATE

## 2024-08-28 PROCEDURE — 59400 OBSTETRICAL CARE: CPT | Performed by: OBSTETRICS & GYNECOLOGY

## 2024-08-28 PROCEDURE — 7220000101 HC DELIVERY VAGINAL/SINGLE

## 2024-08-28 PROCEDURE — 7100000010 HC PHASE II RECOVERY - FIRST 15 MIN

## 2024-08-28 PROCEDURE — 0KQM0ZZ REPAIR PERINEUM MUSCLE, OPEN APPROACH: ICD-10-PCS | Performed by: OBSTETRICS & GYNECOLOGY

## 2024-08-28 PROCEDURE — 7210000100 HC LABOR FEE PER 1 HR

## 2024-08-28 PROCEDURE — 2500000003 HC RX 250 WO HCPCS

## 2024-08-28 PROCEDURE — 51701 INSERT BLADDER CATHETER: CPT

## 2024-08-28 PROCEDURE — 3700000025 EPIDURAL BLOCK: Performed by: ANESTHESIOLOGY

## 2024-08-28 PROCEDURE — 6370000000 HC RX 637 (ALT 250 FOR IP): Performed by: OBSTETRICS & GYNECOLOGY

## 2024-08-28 PROCEDURE — 2580000003 HC RX 258: Performed by: OBSTETRICS & GYNECOLOGY

## 2024-08-28 PROCEDURE — 7100000011 HC PHASE II RECOVERY - ADDTL 15 MIN

## 2024-08-28 PROCEDURE — 6360000002 HC RX W HCPCS: Performed by: OBSTETRICS & GYNECOLOGY

## 2024-08-28 RX ORDER — SIMETHICONE 80 MG
80 TABLET,CHEWABLE ORAL EVERY 6 HOURS PRN
Status: DISCONTINUED | OUTPATIENT
Start: 2024-08-28 | End: 2024-08-30 | Stop reason: HOSPADM

## 2024-08-28 RX ORDER — MISOPROSTOL 200 UG/1
200 TABLET ORAL PRN
Status: DISCONTINUED | OUTPATIENT
Start: 2024-08-28 | End: 2024-08-30 | Stop reason: HOSPADM

## 2024-08-28 RX ORDER — SODIUM CHLORIDE 0.9 % (FLUSH) 0.9 %
5-40 SYRINGE (ML) INJECTION PRN
Status: DISCONTINUED | OUTPATIENT
Start: 2024-08-28 | End: 2024-08-30 | Stop reason: HOSPADM

## 2024-08-28 RX ORDER — OXYCODONE HYDROCHLORIDE 5 MG/1
5 TABLET ORAL EVERY 4 HOURS PRN
Status: DISCONTINUED | OUTPATIENT
Start: 2024-08-28 | End: 2024-08-30 | Stop reason: HOSPADM

## 2024-08-28 RX ORDER — IBUPROFEN 800 MG/1
800 TABLET, FILM COATED ORAL EVERY 8 HOURS
Status: DISCONTINUED | OUTPATIENT
Start: 2024-08-28 | End: 2024-08-30 | Stop reason: HOSPADM

## 2024-08-28 RX ORDER — DEXTROSE, SODIUM CHLORIDE, SODIUM LACTATE, POTASSIUM CHLORIDE, AND CALCIUM CHLORIDE 5; .6; .31; .03; .02 G/100ML; G/100ML; G/100ML; G/100ML; G/100ML
INJECTION, SOLUTION INTRAVENOUS CONTINUOUS
Status: DISCONTINUED | OUTPATIENT
Start: 2024-08-28 | End: 2024-08-30 | Stop reason: HOSPADM

## 2024-08-28 RX ORDER — FAMOTIDINE 20 MG/1
20 TABLET, FILM COATED ORAL 2 TIMES DAILY PRN
Status: DISCONTINUED | OUTPATIENT
Start: 2024-08-28 | End: 2024-08-30 | Stop reason: HOSPADM

## 2024-08-28 RX ORDER — TRANEXAMIC ACID 10 MG/ML
1000 INJECTION, SOLUTION INTRAVENOUS
Status: DISCONTINUED | OUTPATIENT
Start: 2024-08-28 | End: 2024-08-28

## 2024-08-28 RX ORDER — OXYCODONE HYDROCHLORIDE 5 MG/1
10 TABLET ORAL EVERY 4 HOURS PRN
Status: DISCONTINUED | OUTPATIENT
Start: 2024-08-28 | End: 2024-08-30 | Stop reason: HOSPADM

## 2024-08-28 RX ORDER — MISOPROSTOL 200 UG/1
400 TABLET ORAL PRN
Status: DISCONTINUED | OUTPATIENT
Start: 2024-08-28 | End: 2024-08-28

## 2024-08-28 RX ORDER — LANOLIN
CREAM (ML) TOPICAL PRN
Status: DISCONTINUED | OUTPATIENT
Start: 2024-08-28 | End: 2024-08-30 | Stop reason: HOSPADM

## 2024-08-28 RX ORDER — SODIUM CHLORIDE 9 MG/ML
INJECTION, SOLUTION INTRAVENOUS PRN
Status: DISCONTINUED | OUTPATIENT
Start: 2024-08-28 | End: 2024-08-30 | Stop reason: HOSPADM

## 2024-08-28 RX ORDER — SODIUM CHLORIDE 0.9 % (FLUSH) 0.9 %
5-40 SYRINGE (ML) INJECTION EVERY 12 HOURS SCHEDULED
Status: DISCONTINUED | OUTPATIENT
Start: 2024-08-28 | End: 2024-08-30 | Stop reason: HOSPADM

## 2024-08-28 RX ORDER — METHYLERGONOVINE MALEATE 0.2 MG/ML
200 INJECTION INTRAVENOUS PRN
Status: DISCONTINUED | OUTPATIENT
Start: 2024-08-28 | End: 2024-08-28

## 2024-08-28 RX ORDER — DOCUSATE SODIUM 100 MG/1
100 CAPSULE, LIQUID FILLED ORAL 2 TIMES DAILY
Status: DISCONTINUED | OUTPATIENT
Start: 2024-08-28 | End: 2024-08-30 | Stop reason: HOSPADM

## 2024-08-28 RX ORDER — METHYLERGONOVINE MALEATE 0.2 MG/ML
200 INJECTION INTRAVENOUS PRN
Status: DISCONTINUED | OUTPATIENT
Start: 2024-08-28 | End: 2024-08-30 | Stop reason: HOSPADM

## 2024-08-28 RX ORDER — ACETAMINOPHEN 500 MG
1000 TABLET ORAL EVERY 8 HOURS
Status: DISCONTINUED | OUTPATIENT
Start: 2024-08-28 | End: 2024-08-30 | Stop reason: HOSPADM

## 2024-08-28 RX ORDER — ONDANSETRON 4 MG/1
4 TABLET, ORALLY DISINTEGRATING ORAL EVERY 6 HOURS PRN
Status: DISCONTINUED | OUTPATIENT
Start: 2024-08-28 | End: 2024-08-30 | Stop reason: HOSPADM

## 2024-08-28 RX ORDER — ROPIVACAINE HYDROCHLORIDE 2 MG/ML
INJECTION, SOLUTION EPIDURAL; INFILTRATION; PERINEURAL PRN
Status: DISCONTINUED | OUTPATIENT
Start: 2024-08-28 | End: 2024-08-28 | Stop reason: SDUPTHER

## 2024-08-28 RX ORDER — ONDANSETRON 2 MG/ML
4 INJECTION INTRAMUSCULAR; INTRAVENOUS EVERY 6 HOURS PRN
Status: DISCONTINUED | OUTPATIENT
Start: 2024-08-28 | End: 2024-08-30 | Stop reason: HOSPADM

## 2024-08-28 RX ORDER — EPHEDRINE SULFATE/0.9% NACL/PF 50 MG/5 ML
SYRINGE (ML) INTRAVENOUS PRN
Status: DISCONTINUED | OUTPATIENT
Start: 2024-08-28 | End: 2024-08-28 | Stop reason: SDUPTHER

## 2024-08-28 RX ADMIN — ACETAMINOPHEN 1000 MG: 500 TABLET, FILM COATED ORAL at 17:55

## 2024-08-28 RX ADMIN — ONDANSETRON 4 MG: 2 INJECTION INTRAMUSCULAR; INTRAVENOUS at 05:07

## 2024-08-28 RX ADMIN — Medication 166.7 ML: at 10:40

## 2024-08-28 RX ADMIN — SODIUM CHLORIDE 2.5 MILLION UNITS: 9 INJECTION, SOLUTION INTRAVENOUS at 04:11

## 2024-08-28 RX ADMIN — DOCUSATE SODIUM 100 MG: 100 CAPSULE, LIQUID FILLED ORAL at 21:53

## 2024-08-28 RX ADMIN — Medication 10 MG: at 05:19

## 2024-08-28 RX ADMIN — SODIUM CHLORIDE 2.5 MILLION UNITS: 9 INJECTION, SOLUTION INTRAVENOUS at 08:27

## 2024-08-28 RX ADMIN — ROPIVACAINE HYDROCHLORIDE 8 ML/HR: 2 INJECTION, SOLUTION EPIDURAL; INFILTRATION; PERINEURAL at 05:21

## 2024-08-28 RX ADMIN — WITCH HAZEL: 500 SOLUTION RECTAL; TOPICAL at 14:00

## 2024-08-28 RX ADMIN — IBUPROFEN 800 MG: 800 TABLET, FILM COATED ORAL at 21:53

## 2024-08-28 RX ADMIN — SODIUM CHLORIDE, SODIUM LACTATE, POTASSIUM CHLORIDE, AND CALCIUM CHLORIDE 1000 ML: .6; .31; .03; .02 INJECTION, SOLUTION INTRAVENOUS at 05:00

## 2024-08-28 RX ADMIN — ROPIVACAINE HYDROCHLORIDE 3 ML: 2 INJECTION, SOLUTION EPIDURAL; INFILTRATION; PERINEURAL at 05:20

## 2024-08-28 RX ADMIN — OXYTOCIN 1 MILLI-UNITS/MIN: 10 INJECTION, SOLUTION INTRAMUSCULAR; INTRAVENOUS at 05:36

## 2024-08-28 RX ADMIN — IBUPROFEN 800 MG: 800 TABLET, FILM COATED ORAL at 13:59

## 2024-08-28 RX ADMIN — OXYCODONE HYDROCHLORIDE 5 MG: 5 TABLET ORAL at 15:50

## 2024-08-28 RX ADMIN — Medication: at 14:00

## 2024-08-28 RX ADMIN — MORPHINE SULFATE 4 MG: 4 INJECTION INTRAVENOUS at 01:02

## 2024-08-28 ASSESSMENT — PAIN DESCRIPTION - LOCATION
LOCATION: PERINEUM
LOCATION: ABDOMEN

## 2024-08-28 ASSESSMENT — PAIN DESCRIPTION - DESCRIPTORS
DESCRIPTORS: ACHING;CRAMPING
DESCRIPTORS: ACHING;BURNING;CRAMPING

## 2024-08-28 ASSESSMENT — PAIN SCALES - GENERAL: PAINLEVEL_OUTOF10: 8

## 2024-08-28 NOTE — H&P
History & Physical    Name: Shalonda Uribe MRN: [id number]  SSN: [id number]    YOB: 1995  Age: 28 y.o.  Sex: female      Subjective:     Estimated Date of Delivery: 24  OB History    Para Term  AB Living   1             SAB IAB Ectopic Molar Multiple Live Births                    # Outcome Date GA Lbr Carlos/2nd Weight Sex Type Anes PTL Lv   1 Current                Ms. Uribe is admitted with pregnancy at 39w5d for induction of labor due to Elective indications > 39 weeks. Prenatal course  was complicated by  borderline IUGR . Please see prenatal records for details.    Past Medical History:   Diagnosis Date    Endometriosis     High cholesterol      Past Surgical History:   Procedure Laterality Date    EYE SURGERY Bilateral     Lasix     Social History     Occupational History    Not on file   Tobacco Use    Smoking status: Never    Smokeless tobacco: Never   Vaping Use    Vaping status: Never Used   Substance and Sexual Activity    Alcohol use: Never    Drug use: Never    Sexual activity: Yes     Partners: Male     No family history on file.  No Known Allergies  Prior to Admission medications    Medication Sig Start Date End Date Taking? Authorizing Provider   CHOLINE PO Take 350 mg by mouth daily   Yes Brandon Summers MD   zinc gluconate 50 MG tablet Take 1 tablet by mouth daily   Yes Brandon Summers MD   Coenzyme Q10 (COQ10) 100 MG CAPS Take by mouth   Yes Brandon Summers MD   PSYLLIUM HUSK PO Take 500 mg by mouth daily   Yes Brandon Summers MD   Ferrous Sulfate (IRON PO) Take 4 tablets by mouth daily 23  Yes Brandon Summers MD   b complex vitamins capsule Take 1 capsule by mouth daily   Yes Brandon Summers MD   vitamin D (CHOLECALCIFEROL) 125 MCG (5000 UT) CAPS capsule Take 1 capsule by mouth daily   Yes Brandon Summers MD   vitamin C (ASCORBIC ACID) 500 MG tablet Take 1 tablet by mouth daily   Yes Kristopher

## 2024-08-28 NOTE — ANESTHESIA PRE PROCEDURE
Department of Anesthesiology  Preprocedure Note       Name:  Shalonda Uribe   Age:  28 y.o.  :  1995                                          MRN:  [id number]         Date:  2024      Surgeon: * No surgeons listed *    Procedure: * No procedures listed *    Medications prior to admission:   Prior to Admission medications    Medication Sig Start Date End Date Taking? Authorizing Provider   CHOLINE PO Take 350 mg by mouth daily   Yes Brandon Summers MD   zinc gluconate 50 MG tablet Take 1 tablet by mouth daily   Yes Brandon Summers MD   Coenzyme Q10 (COQ10) 100 MG CAPS Take by mouth   Yes Brandon Summers MD   PSYLLIUM HUSK PO Take 500 mg by mouth daily   Yes Brandon Summers MD   Ferrous Sulfate (IRON PO) Take 4 tablets by mouth daily 23  Yes Brandon Summers MD   b complex vitamins capsule Take 1 capsule by mouth daily   Yes Brandon Summers MD   vitamin D (CHOLECALCIFEROL) 125 MCG (5000 UT) CAPS capsule Take 1 capsule by mouth daily   Yes Brandon Summers MD   vitamin C (ASCORBIC ACID) 500 MG tablet Take 1 tablet by mouth daily   Yes Brandon Summers MD   Omega 3 1000 MG CAPS Take by mouth   Yes Brandon Summers MD   Multiple Vitamins-Minerals (THERAPEUTIC MULTIVITAMIN-MINERALS) tablet Take 1 tablet by mouth daily   Yes Brandon Summers MD   Levomefolate Glucosamine (METHYL-FOLATE PO) Take by mouth   Yes Brandon Summers MD   5-Hydroxytryptophan (5-HTP) 100 MG CAPS Take by mouth  Patient not taking: Reported on 2024    Brandon Summers MD       Current medications:    Current Facility-Administered Medications   Medication Dose Route Frequency Provider Last Rate Last Admin    miSOPROStol (CYTOTEC) pre-split tablet TABS 50 mcg  50 mcg Buccal Q4H Drake Montez MD   50 mcg at 24 6631    terbutaline (BRETHINE) injection 0.25 mg  0.25 mg SubCUTAneous Once PRN Drake Montez MD        dextrose 5 % in lactated  ringers infusion   IntraVENous Continuous Drake Montez  mL/hr at 08/27/24 2347 New Bag at 08/27/24 2347    lactated ringers bolus 500 mL  500 mL IntraVENous PRN Drake Montez MD        Or    lactated ringers bolus 1,000 mL  1,000 mL IntraVENous PRN Drake Montez MD        sodium chloride flush 0.9 % injection 5-40 mL  5-40 mL IntraVENous 2 times per day Drake Montez MD        sodium chloride flush 0.9 % injection 5-40 mL  5-40 mL IntraVENous PRN Drake Montez MD        0.9 % sodium chloride infusion   IntraVENous PRN Drake Montez MD        ondansetron (ZOFRAN) injection 4 mg  4 mg IntraVENous Q6H PRN Drake Montez MD   4 mg at 08/28/24 0507    Or    ondansetron (ZOFRAN-ODT) disintegrating tablet 4 mg  4 mg Oral Q6H PRN Drake Montez MD        morphine sulfate (PF) injection 4 mg  4 mg IntraVENous Q2H PRN Drake Montez MD   4 mg at 08/28/24 0102    penicillin G potassium 2.5 million units in 0.9% sodium chloride 100 mL IVPB  2.5 Million Units IntraVENous Q4H Drake Montez MD   Stopped at 08/28/24 0441    oxytocin (PITOCIN) 30 units in 500 mL infusion  1-20 mo-units/min IntraVENous Continuous Drake Montez MD         Facility-Administered Medications Ordered in Other Encounters   Medication Dose Route Frequency Provider Last Rate Last Admin    ePHEDrine injection   IntraVENous PRN Shirley Louie APRN - CRNA   10 mg at 08/28/24 0519    ROPivacaine (NAROPIN) 0.2% injection 0.2%   Epidural PRN Shirley Louie APRN - CRNA   8 mL/hr at 08/28/24 0521       Allergies:  No Known Allergies    Problem List:    Patient Active Problem List   Diagnosis Code    Normal pregnancy, first Z34.00    COVID-19 affecting pregnancy in first trimester O98.511, U07.1    GBS (group B Streptococcus carrier), +RV culture, currently pregnant O99.820    Borderline IUGR O36.5990    Primigravida in third trimester Z34.03

## 2024-08-28 NOTE — PROGRESS NOTES
Pt OOB to bathroom with assistance. First time up since delivery with epidural. Pt able to void. Pericare taught; clean pad, ice pack and panties on.

## 2024-08-28 NOTE — ANESTHESIA PROCEDURE NOTES
Epidural Block    Patient location during procedure: OB  Start time: 8/28/2024 5:03 AM  End time: 8/28/2024 5:09 AM  Reason for block: labor epidural  Staffing  Performed: anesthesiologist   Anesthesiologist: Jesse Ceballos MD  Performed by: Jesse Ceballos MD  Authorized by: Jesse Ceballos MD    Epidural  Patient position: sitting  Prep: ChloraPrep  Patient monitoring: continuous pulse ox and frequent blood pressure checks  Approach: midline  Location: L3-4  Injection technique: KANDY saline  Provider prep: mask and sterile gloves  Needle  Needle type: Tuohy   Needle gauge: 17 G  Epidural needle length (in): 10 cm.  Needle insertion depth: 4 cm  Catheter type: end hole  Catheter size: 19 G  Catheter at skin depth: 8 cm  Test dose: negativeCatheter Secured: tegaderm and tape (liquid adhesive)  Assessment  Hemodynamics: stable  Attempts: 1  Outcomes: patient tolerated procedure well and uncomplicated  Additional Notes  3 cc 1% lidocaine local at needle insertion site.    Risks discussed including damage to muscle or nerve, post-procedure headache, bleeding, infection  Preanesthetic Checklist  Completed: patient identified, IV checked, risks and benefits discussed, equipment checked, pre-op evaluation, timeout performed, anesthesia consent given, oxygen available and monitors applied/VS acknowledged

## 2024-08-28 NOTE — PROGRESS NOTES
Post-Partum Day Number 1 Progress Note  Shalonda Uribe  [id number]    Patient doing well post-partum without significant complaint.  Voiding without difficulty, normal lochia.    Vitals:  Patient Vitals for the past 24 hrs:   BP Temp Temp src Pulse Resp SpO2   24 0733 101/61 98.3 °F (36.8 °C) Oral 84 18 96 %   24 2323 92/62 98.4 °F (36.9 °C) Oral 88 18 94 %   24 1447 105/73 97.7 °F (36.5 °C) Oral 89 17 97 %   24 1300 (!) 105/52 -- -- (!) 109 -- --   24 1246 (!) 103/53 -- -- 90 -- --   24 1215 (!) 104/56 -- -- 100 -- --   24 1200 101/62 -- -- (!) 101 -- --   24 1145 (!) 103/57 -- -- 71 -- --   24 1130 (!) 104/57 -- -- 90 -- --   24 1115 (!) 102/48 -- -- 84 -- --   24 1101 111/61 99 °F (37.2 °C) Oral (!) 115 -- 97 %   24 1045 (!) 108/59 -- -- 72 -- --   24 1016 117/83 -- -- 60 -- --     Temp (24hrs), Av.4 °F (36.9 °C), Min:97.7 °F (36.5 °C), Max:99 °F (37.2 °C)      Vital signs stable, afebrile.    Exam:  Patient without distress.               Abdomen soft, fundus firm at level of umbilicus, nontender               Labs:   No results found for this or any previous visit (from the past 24 hour(s)).      Assessment and Plan:  Patient appears to be having uncomplicated post-partum course.  Continue routine perineal care and maternal education.  Plan discharge tomorrow if no problems occur.   Elective IOL  +/+

## 2024-08-28 NOTE — L&D DELIVERY NOTE
Neftali Uribe [[id number]]      Labor Events     Labor: No   Steroids: None  Cervical Ripening Date/Time:  24 19:53:00   Cervical Ripening Type: Misoprostol  Antibiotics Received during Labor: Yes  Rupture Date/Time:  24 00:45:00   Rupture Type: SROM, Intact  Fluid Color: Clear  Fluid Odor: None  Fluid Volume: Moderate  Induction: Oxytocin  Labor Complications: None       Anesthesia    Method: Epidural       Labor Event Times      Labor onset date/time:        Dilation complete date/time:  24 09:33:00     Start pushing date/time:  2024 09:37:00   Decision date/time (emergent ):            Labor Length    2nd stage: 1h 02m  3rd stage: 0h 07m       Delivery Details      Delivery Date: 24 Delivery Time: 10:35:00   Delivery Type: Vaginal, Spontaneous               Presentation    Presentation: Vertex  Position: Left  _: Occiput  _: Anterior       Shoulder Dystocia    Shoulder Dystocia Present?: No       Assisted Delivery Details    Forceps Attempted?: No  Vacuum Extractor Attempted?: No                           Cord    Vessels: 3 Vessels  Complications: None  Delayed Cord Clamping?: Yes  Cord Clamped Date/Time: 2024 10:36:00  Cord Blood Disposition: Lab  Gases Sent?: No              Placenta    Date/Time: 2024 10:42:00  Removal: Spontaneous  Appearance: Intact  Disposition: Discarded       Lacerations    Episiotomy: None  Perineal Lacerations: 2nd  Other Lacerations: vaginal laceration  Vaginal Laceration?: Yes Repaired?: Yes   Number of Repair Packets: 3       Vaginal Counts    Initial Count Personnel: [facility]  Initial Count Verified By: HOA PARSON RN  Intial Sponge Count: Correct Intial Needles Count: Correct Intial Instruments Count: Correct   Final Sponges Count: Correct Final Needles  Count: Correct Final Instruments Count: Correct   Final Count Personnel: [facility]  Final Count Verified By: SHERICE INFANTE       Blood Loss  Mother:

## 2024-08-28 NOTE — LACTATION NOTE
This note was copied from a baby's chart.  Started mom pumping at mom request.  Mom has attempted breastfeeding twice with no latch, nipples are flat.  Discussed first 24 hour expectation but mom would like to try to pump now.  Pump brought to room, instructed on parts and pumping for 15 minutes every 3 hours if infant does not latch, verbalized understanding.  Mom got good drops out of both breasts, finger fed by RN to infant, demonstrated to mom and dad.    Attempted to breast feed after pumping while nipples somewhat everted, few sucks but no true latch achieved.  Educated mom to keep attempting and if no latch, pump, verbalized understanding.

## 2024-08-29 PROCEDURE — 1100000000 HC RM PRIVATE

## 2024-08-29 PROCEDURE — 6370000000 HC RX 637 (ALT 250 FOR IP): Performed by: OBSTETRICS & GYNECOLOGY

## 2024-08-29 RX ADMIN — ACETAMINOPHEN 1000 MG: 500 TABLET, FILM COATED ORAL at 19:13

## 2024-08-29 RX ADMIN — ACETAMINOPHEN 1000 MG: 500 TABLET, FILM COATED ORAL at 02:00

## 2024-08-29 RX ADMIN — IBUPROFEN 800 MG: 800 TABLET, FILM COATED ORAL at 06:11

## 2024-08-29 RX ADMIN — ONDANSETRON 4 MG: 4 TABLET, ORALLY DISINTEGRATING ORAL at 20:52

## 2024-08-29 RX ADMIN — IBUPROFEN 800 MG: 800 TABLET, FILM COATED ORAL at 23:44

## 2024-08-29 RX ADMIN — IBUPROFEN 800 MG: 800 TABLET, FILM COATED ORAL at 14:43

## 2024-08-29 RX ADMIN — DOCUSATE SODIUM 100 MG: 100 CAPSULE, LIQUID FILLED ORAL at 10:26

## 2024-08-29 RX ADMIN — DOCUSATE SODIUM 100 MG: 100 CAPSULE, LIQUID FILLED ORAL at 19:13

## 2024-08-29 RX ADMIN — ACETAMINOPHEN 1000 MG: 500 TABLET, FILM COATED ORAL at 10:26

## 2024-08-29 NOTE — ANESTHESIA POSTPROCEDURE EVALUATION
Department of Anesthesiology  Postprocedure Note    Patient: Shalonda Uribe  MRN: [id number]  YOB: 1995  Date of evaluation: 8/28/2024    Procedure Summary       Date: 08/28/24 Room / Location:     Anesthesia Start: 0500 Anesthesia Stop: 1035    Procedure: Labor Analgesia Diagnosis:     Scheduled Providers:  Responsible Provider: Yobani Bennett MD    Anesthesia Type: Epidural ASA Status: 2            Anesthesia Type: Epidural    Real Phase I:      Real Phase II:      Anesthesia Post Evaluation    Patient location during evaluation: bedside  Patient participation: complete - patient participated  Level of consciousness: awake and alert  Airway patency: patent  Nausea & Vomiting: no nausea and no vomiting  Cardiovascular status: hemodynamically stable  Respiratory status: acceptable, nonlabored ventilation and spontaneous ventilation  Hydration status: euvolemic  Comments: Lower extremity neurological function returned to baseline per patient  Multimodal analgesia pain management approach    No notable events documented.

## 2024-08-29 NOTE — LACTATION NOTE
This note was copied from a baby's chart.  Bedside nurse informed lactation consultant that mom had stated that the new flange felt better but was still \"too\" big. Took 18 mm flanges to mom and changed them out while she was pumping and she stated the the new flanges felt much better. Lactation consultant will follow up tomorrow.

## 2024-08-29 NOTE — LACTATION NOTE
This note was copied from a baby's chart.  In to follow up with mom and infant. Mom had just pumped and expressed five ml. Assisted with latch on her right breast in the football hold. Infant latched and started to suck. Mom stated that it was too painful to continue. Brought infant off the breast and assisted mom with feeding infant the expressed colostrum with a curve tip syringe while he sucked on her finger. Noted mom's nipples were swollen but measured them: left 19 mm and right 18 mm. Gave mom 21 mm flanges to use at next pumping attempt.

## 2024-08-30 ENCOUNTER — LACTATION ENCOUNTER (OUTPATIENT)
Dept: MOTHER INFANT UNIT | Age: 29
End: 2024-08-30

## 2024-08-30 VITALS
RESPIRATION RATE: 16 BRPM | HEART RATE: 77 BPM | OXYGEN SATURATION: 95 % | SYSTOLIC BLOOD PRESSURE: 98 MMHG | TEMPERATURE: 98.9 F | DIASTOLIC BLOOD PRESSURE: 67 MMHG

## 2024-08-30 PROCEDURE — 6370000000 HC RX 637 (ALT 250 FOR IP): Performed by: OBSTETRICS & GYNECOLOGY

## 2024-08-30 RX ORDER — IBUPROFEN 800 MG/1
800 TABLET, FILM COATED ORAL EVERY 8 HOURS
Qty: 120 TABLET | Refills: 3 | Status: SHIPPED | OUTPATIENT
Start: 2024-08-30

## 2024-08-30 RX ORDER — PSEUDOEPHEDRINE HCL 30 MG
100 TABLET ORAL 2 TIMES DAILY
Qty: 60 CAPSULE | Refills: 0 | Status: SHIPPED | OUTPATIENT
Start: 2024-08-30

## 2024-08-30 RX ADMIN — IBUPROFEN 800 MG: 800 TABLET, FILM COATED ORAL at 08:56

## 2024-08-30 RX ADMIN — DOCUSATE SODIUM 100 MG: 100 CAPSULE, LIQUID FILLED ORAL at 08:56

## 2024-08-30 NOTE — LACTATION NOTE
This note was copied from a baby's chart.  Lactation visit. Not latching. Attempting. Nipples sore but improving from day 1. Using 18mm flanges with good success and wearing silverette nipple covers and nipple cream between feeds/pump sessions. Has inserts on order for pump at home. Has Spectra pump. Full review of pump parts, setup and pump settings for Spectra pump reviewed with parents at length. Handout provided also. Written feeding plan given for home use, discussed intake needs and volumes. Mom pumped enough milk at last 2 pump sessions for infant, has supplemented formula ad mckinley. All questions answered. Attempt latching if able and follow plan.

## 2024-08-30 NOTE — LACTATION NOTE
This note was copied from a baby's chart.  Individualized Feeding Plan for Breastfeeding   Lactation Services [phone]    As much as possible, hold your baby on your chest so baby’s bare skin is against your bare skin with a blanket covering baby’s back, especially 30 minutes before it is time for baby to eat.    Watch for early feeding cues such as, licking lips, sucking motions, rooting, hands to mouth. Crying is a late feeding cue.      Feed your baby at least 8 times in 24 hours, or more if your baby is showing feeding cues.  If baby is sleepy put baby skin to skin and watch for hunger cues.  To rouse baby: unwrap, undress, massage hands, feet, & back, change diaper, gently change baby’s position from lying to sitting.   15-20 minutes on the first breast of active breastfeeding is considered a good feeding. Good, active breastfeeding is when baby is alert, tugging the nipple, their ear may move, and you can hear swallows.  Allow baby to finish the first side before changing sides.     Sleeping at the breast or only brief, light sucks should not be considered a good, full breastfeed.  At each feeding:  __x__1.  Do “Suck Practice” on finger before each feeding until sucking pattern is smooth.  Try using index finger.  Nail down towards tongue.       __x__2.  Hand Express for a few minutes prior to latching to help start milk flow.     __x__3.  Baby needs to NURSE WELL x 15-20 minutes on at least first breast, burp and offer 2nd breast at every feeding.  If no sustained latch only attempt at breast for 10 minutes.     If baby does NOT latch on and feed well on at least one side, you should:   __x__4. Double pump for 15 minutes with breast massage and compression.  Hand express for an additional 2-3 minutes per side. Pump after each feeding attempt or poor feeding, up to 8 times per day. If you are not putting baby to the breast you need to pump 8 times a day. Pump every 3 hours.    __x__5. Give baby all of

## 2024-08-30 NOTE — PROGRESS NOTES
Shift assessment complete as noted. Patient request pain medicine. Questions encouraged and answered. Encouraged to call for needs or concerns. Verbalizes understanding.

## 2024-08-30 NOTE — PROGRESS NOTES
Patient discharged to home per MD orders.  Discharge instructions reviewed with patient. Questions encouraged and answered. Patient verbalizes understanding. Patient escorted by [facility] staff to private vehicle. Stable at discharge.

## 2024-08-30 NOTE — PLAN OF CARE
Problem: Pain  Goal: Verbalizes/displays adequate comfort level or baseline comfort level  8/30/2024 0823 by Mouna Pierre, RN  Outcome: Completed  8/30/2024 0053 by Riddhi Mantilla RN  Outcome: Progressing     Problem: Safety - Adult  Goal: Free from fall injury  8/30/2024 0823 by Mouna Pierre, RN  Outcome: Completed  8/30/2024 0053 by Riddhi Mantilla RN  Outcome: Progressing

## 2024-08-30 NOTE — DISCHARGE SUMMARY
Discharge Summary     Date of Admission:  2024  6:18 PM  Date of Discharge:  24       Shalonda Uribe 28 y.o.  presented at 39w5d for induction/in active labor.  Pt had  without incident.  See delivery note for all delivery details.  Pt's PP course was uneventful.  On day of D/C, she was ambulating well, afebrile, with lochia < menses.  She was discharged home with medications as below.  HTN diagnosis: no   Routine PP instructions given to patient.  She is to follow up with [facility] in 6 weeks for PP exam.    Vitals:    24 0700   BP: 98/67   Pulse: 77   Resp: 16   Temp: 98.9 °F (37.2 °C)   SpO2: 95%   General: NAD  Cardio/pulm: RR and normal effort  Fundus below umbilicus   Trace LE edema        Medication List        START taking these medications      docusate 100 MG Caps  Commonly known as: COLACE, DULCOLAX  Take 100 mg by mouth 2 times daily     ibuprofen 800 MG tablet  Commonly known as: ADVIL;MOTRIN  Take 1 tablet by mouth every 8 (eight) hours     witch hazel-glycerin pad  Commonly known as: TUCKS  Place rectally as needed.            CONTINUE taking these medications      b complex vitamins capsule     CHOLINE PO     CoQ10 100 MG Caps     METHYL-FOLATE PO     Omega 3 1000 MG Caps     PSYLLIUM HUSK PO     therapeutic multivitamin-minerals tablet     vitamin C 500 MG tablet  Commonly known as: ASCORBIC ACID     vitamin D 125 MCG (5000 UT) Caps capsule  Commonly known as: CHOLECALCIFEROL     zinc gluconate 50 MG tablet            STOP taking these medications      IRON PO            ASK your doctor about these medications      5- MG Caps               Where to Get Your Medications        These medications [id number] - [place] [id number] [place] [address] - P [phone] - F [phone]  [address] [place] [id number]      Phone: [phone]   docusate 100 MG Caps  ibuprofen 800 MG tablet  witch hazel-glycerin pad          Danelle Mcadams DO  9:39 AM  08/30/24

## 2024-08-30 NOTE — DISCHARGE INSTRUCTIONS
After Your Delivery (the Postpartum Period): Care Instructions  Overview     After childbirth (postpartum period), your body goes through many changes as you recover. In these weeks after delivery, try to take good care of yourself. Get rest whenever you can and accept help from others.  It may take 4 to 6 weeks to feel like yourself again, and possibly longer if you had a  birth. You may feel sore or very tired as you recover. After delivery, you may continue to have contractions as the uterus returns to the size it was before your pregnancy. You will also have some vaginal bleeding. And you may have pain around the vagina as you heal. Several days after delivery you may also have pain and swelling in your breasts as they fill with milk. There are things you can do at home to help ease these discomforts.  After childbirth, it's common to feel emotional. You may feel irritable, cry easily, and feel happy one minute and sad the next. This is called the \"baby blues.\" Hormone changes are one cause of these emotional changes. These feelings usually get better within a couple of weeks. If they don't, talk to your doctor or midwife.  In the first couple of weeks after you give birth, your doctor or midwife may want to check in with you and make a plan for follow-up care. You will likely have a complete postpartum visit in the first 3 months after delivery. At that time, your doctor or midwife will check on your recovery and see how you're doing. But if you have questions or concerns before then, you can always call your doctor or midwife.  Follow-up care is a key part of your treatment and safety. Be sure to make and go to all appointments, and call your doctor if you are having problems. It's also a good idea to know your test results and keep a list of the medicines you take.  How can you care for yourself at home?  Taking care of your body  Use pads instead of tampons for bleeding. After birth, you will  groin.  Swelling in the leg or groin.  A color change on the leg or groin. The skin may be reddish or purplish, depending on your usual skin color.     You have signs of preeclampsia, such as:  Sudden swelling of your face, hands, or feet.  New vision problems (such as dimness, blurring, or seeing spots).  A severe headache.     You have signs of heart failure, such as:  New or increased shortness of breath.  New or worse swelling in your legs, ankles, or feet.  Sudden weight gain, such as more than 2 to 3 pounds in a day or 5 pounds in a week.  Feeling so tired or weak that you cannot do your usual activities.     You had spinal or epidural pain relief and have:  New or worse back pain.  Increased pain, swelling, warmth, or redness at the injection site.  Tingling, weakness, or numbness in your legs or groin.   Watch closely for changes in your health, and be sure to contact your doctor or midwife if:    Your vaginal bleeding isn't decreasing.     You feel sad, anxious, or hopeless for more than a few days.     You are having problems with your breasts or breastfeeding.   Where can you learn more?  Go to https://www.[employer].net/patientEd and enter A461 to learn more about \"After Your Delivery (the Postpartum Period): Care Instructions.\"  Current as of: July 10, 2023  Content Version: 14.1  © 2145-1413 [employer].   Care instructions adapted under license by [employer]. If you have questions about a medical condition or this instruction, always ask your healthcare professional. [employer] disclaims any warranty or liability for your use of this information.

## 2024-08-30 NOTE — CARE COORDINATION
Chart reviewed- first time parent.   spoke with patient's  as patient was asleep.        provided education on [facility].  Family was undecided on need for home visit.  No referral will be made at this time.  Family has this 's contact information should they decide to participate in program.     Patient given informational packet on  mood & anxiety disorders (resources/education).     Family denies any additional needs from  at this time.  Family has 's contact information should any needs/questions arise.     Savanah Whitaker, SANGEETA-JITENDRA, [facility]  [facility]   [phone]

## 2024-10-09 ENCOUNTER — POSTPARTUM VISIT (OUTPATIENT)
Dept: OBGYN CLINIC | Age: 29
End: 2024-10-09

## 2024-10-09 VITALS
BODY MASS INDEX: 21.2 KG/M2 | HEIGHT: 60 IN | SYSTOLIC BLOOD PRESSURE: 108 MMHG | DIASTOLIC BLOOD PRESSURE: 64 MMHG | WEIGHT: 108 LBS

## 2024-10-09 PROBLEM — O98.511 COVID-19 AFFECTING PREGNANCY IN FIRST TRIMESTER: Status: RESOLVED | Noted: 2024-06-07 | Resolved: 2024-10-09

## 2024-10-09 PROBLEM — Z34.03 PRIMIGRAVIDA IN THIRD TRIMESTER: Status: RESOLVED | Noted: 2024-08-27 | Resolved: 2024-10-09

## 2024-10-09 PROBLEM — O99.820 GBS (GROUP B STREPTOCOCCUS CARRIER), +RV CULTURE, CURRENTLY PREGNANT: Status: RESOLVED | Noted: 2024-08-04 | Resolved: 2024-10-09

## 2024-10-09 PROBLEM — O36.5990 FETAL GROWTH RESTRICTION ANTEPARTUM: Status: RESOLVED | Noted: 2024-08-09 | Resolved: 2024-10-09

## 2024-10-09 PROBLEM — Z34.00 NORMAL PREGNANCY, FIRST: Status: RESOLVED | Noted: 2024-02-26 | Resolved: 2024-10-09

## 2024-10-09 PROBLEM — U07.1 COVID-19 AFFECTING PREGNANCY IN FIRST TRIMESTER: Status: RESOLVED | Noted: 2024-06-07 | Resolved: 2024-10-09

## 2024-10-09 PROCEDURE — 0503F POSTPARTUM CARE VISIT: CPT | Performed by: OBSTETRICS & GYNECOLOGY

## 2024-10-09 RX ORDER — FERROUS SULFATE 325(65) MG
650 TABLET ORAL
COMMUNITY

## 2024-10-09 NOTE — PROGRESS NOTES
Subjective:   Ms. Shalonda Uribe is a 28 y.o.  y.o. Female  who is now 6 weeks postpartum. She is feeling well and happy.    Method of delivery: normal spontaneous vaginal delivery  Pregnancy complications: none.    Feeding: Breastfeeding and also supplementing with formula and is not experiencing problems.    She plans to use condoms for contraception.    Date of last Pap smear: 3/1/24    Patient Active Problem List   Diagnosis   (none) - all problems resolved or deleted          Current Outpatient Medications:     ferrous sulfate (IRON 325) 325 (65 Fe) MG tablet, Take 2 tablets by mouth daily (with breakfast), Disp: , Rfl:     Prenatal MV-Min-Fe Fum-FA-DHA (PRENATAL 1 PO), Take by mouth, Disp: , Rfl:     PSYLLIUM HUSK PO, Take 500 mg by mouth daily, Disp: , Rfl:     vitamin D (CHOLECALCIFEROL) 125 MCG (5000 UT) CAPS capsule, Take 1 capsule by mouth daily, Disp: , Rfl:     vitamin C (ASCORBIC ACID) 500 MG tablet, Take 1 tablet by mouth daily, Disp: , Rfl:     Omega 3 1000 MG CAPS, Take by mouth, Disp: , Rfl:     zinc gluconate 50 MG tablet, Take 1 tablet by mouth daily (Patient not taking: Reported on 10/9/2024), Disp: , Rfl:     Multiple Vitamins-Minerals (THERAPEUTIC MULTIVITAMIN-MINERALS) tablet, Take 1 tablet by mouth daily (Patient not taking: Reported on 10/9/2024), Disp: , Rfl:     Levomefolate Glucosamine (METHYL-FOLATE PO), Take by mouth (Patient not taking: Reported on 10/9/2024), Disp: , Rfl:      No Known Allergies       Objective:   Physical Exam:  Blood pressure 108/64, height 1.524 m (5'), weight 49 kg (108 lb), last menstrual period 2023, currently breastfeeding.   Physical Exam: GENERAL APPEARANCE: alert, well appearing, in no apparent distress, oriented to person, place and time  BREASTS: no masses noted, no significant tenderness, no palpable axillary nodes, no skin changes, lactating  ABDOMEN POSTPARTUM: benign non-tender, without masses or organomegaly palpable  EXTERNAL GENITALIA